# Patient Record
Sex: MALE | Race: WHITE | NOT HISPANIC OR LATINO | Employment: OTHER | URBAN - METROPOLITAN AREA
[De-identification: names, ages, dates, MRNs, and addresses within clinical notes are randomized per-mention and may not be internally consistent; named-entity substitution may affect disease eponyms.]

---

## 2022-09-23 ENCOUNTER — TRANSFERRED RECORDS (OUTPATIENT)
Dept: HEALTH INFORMATION MANAGEMENT | Facility: CLINIC | Age: 87
End: 2022-09-23

## 2022-11-04 ENCOUNTER — TRANSCRIBE ORDERS (OUTPATIENT)
Dept: OTHER | Age: 87
End: 2022-11-04

## 2022-11-04 DIAGNOSIS — C34.90 SMALL CELL LUNG CANCER (H): Primary | ICD-10-CM

## 2022-11-09 ENCOUNTER — TRANSFERRED RECORDS (OUTPATIENT)
Dept: HEALTH INFORMATION MANAGEMENT | Facility: CLINIC | Age: 87
End: 2022-11-09

## 2022-11-10 NOTE — PROGRESS NOTES
MEDICAL RECORDS REQUEST   Radiation Oncology  [address] [place] [address]  PHONE: [phone]  Fax: [phone]        FUTURE VISIT INFORMATION                                                   Penelope GAMBINO Amelia, : 1935 scheduled for future visit at [facility]    APPOINTMENT INFORMATION:    Date: 2022    Provider: Dr. Cota   Reason for Visit/Diagnosis: Small cell lung cancer    REFERRAL INFORMATION:    Referring provider:  Mell Banuelos MD- [facility] Recs     RECORDS REQUESTED FOR VISIT                                                     Action    Action Taken 11/10/2022 8:19AM HORACE   I pulled outside recs into CE/EPIC    I called [facility] dept [phone] - they will push ALL scans from  to [place] PACS    I called Mr. Cisneros - unavailable. I tried calling again- no luck.     11/10/2022 11:15AM HORACE  I called [facility] to see if they have an WING on file for pt Penelope Ph: [phone]  - I spoke [facility] in medical records- she will fax records over to [phone] Attn: Cristy     I will email the records to LASHAUN Mao once they have been received   [email or web address]    I resolved the images from [facility] in PACS    I called [facility] again - there are no rad onc notes.     11/10/2022 12:26pm HORACE   I faxed records from [facility] to HIM and emailed them to Daylin    11/10/2022 1:15pm HORACE   I called [facility] to see if they have a PET Scan Ph: [phone] #4- they don't have the pt in their system.     I called Mr. Cisneros again - no luck.     I called [facility] PH: [phone]- they don't have the pt in their system either.     2022 10:07AM HORACE   I called [facility] Phone: [phone] - need PET scan report and image to be pushed   They are able to push the PET Scan to [facility] PACS! They will fax the report over.     2022 10:34AM KEB   I resolved the PET scan in PACS  And I faxed the report to HIM. I  emailed the PET report to Daylin.      LUNG/CHEST WALL    CHEST CT AND REPORTS Complete- [facility] images are in PACS   PRE & POST-OP SCANS AND REPORT yes- [facility]    PET/CT AND REPORT Complete- Images are in PACS   ANY RECENT LABS yes   [facility] Records Complete - in [facility]   SURGICAL REPORT yes   PATHOLOGY REPORT yes   *Send all radiation Prior radiation records for both [facility] and [facility] Radiation Oncology patients to [facility] with  ATTN: OMARI/Cory Dosi/Physics

## 2022-11-11 ENCOUNTER — APPOINTMENT (OUTPATIENT)
Dept: RADIATION ONCOLOGY | Facility: CLINIC | Age: 87
End: 2022-11-11
Payer: MEDICARE

## 2022-11-11 ENCOUNTER — PRE VISIT (OUTPATIENT)
Dept: RADIATION ONCOLOGY | Facility: CLINIC | Age: 87
End: 2022-11-11

## 2022-11-11 ENCOUNTER — OFFICE VISIT (OUTPATIENT)
Dept: RADIATION ONCOLOGY | Facility: CLINIC | Age: 87
End: 2022-11-11
Attending: INTERNAL MEDICINE
Payer: MEDICARE

## 2022-11-11 VITALS
RESPIRATION RATE: 18 BRPM | OXYGEN SATURATION: 96 % | HEART RATE: 67 BPM | TEMPERATURE: 97.8 F | SYSTOLIC BLOOD PRESSURE: 150 MMHG | WEIGHT: 155.4 LBS | DIASTOLIC BLOOD PRESSURE: 69 MMHG

## 2022-11-11 DIAGNOSIS — C34.91 SMALL CELL CARCINOMA OF RIGHT LUNG (H): Primary | ICD-10-CM

## 2022-11-11 PROCEDURE — 77263 THER RADIOLOGY TX PLNG CPLX: CPT | Performed by: SURGERY

## 2022-11-11 PROCEDURE — 99205 OFFICE O/P NEW HI 60 MIN: CPT | Mod: 25 | Performed by: SURGERY

## 2022-11-11 PROCEDURE — 77334 RADIATION TREATMENT AID(S): CPT | Performed by: SURGERY

## 2022-11-11 RX ORDER — AMLODIPINE BESYLATE 5 MG/1
5 TABLET ORAL DAILY
COMMUNITY

## 2022-11-11 ASSESSMENT — PAIN SCALES - GENERAL: PAINLEVEL: NO PAIN (0)

## 2022-11-11 NOTE — LETTER
2022         RE: Penelope Cisneros  [address] [facility] [address]        Dear Colleague,    Thank you for referring your patient, Penelope Cisneros, to the [facility] [place]. Please see a copy of my visit note below.       Department of Radiation Oncology  [facility]: Cancer Center  [facility]  [address] [address] [place] [address]  [phone]       Consultation Note    Name: Penelope Cisneros MRN: [id number]   : 1935   Date of Service: 2022  Referring:  ([facility])     Reason for consultation: Limited stage small cell lung cancer    History of Present Illness     Mr. Cisneros is a 87M with LS-SCLC, [id number] of the right upper lobe. PET/CT positive in RUL and 10R.  MRI with indeterminate lesions in right caudate and right precentral gyrus wihtout any associated flair changes, most likely felt to be ischemic related.      Briefly, patient's oncologic history is as follows:    Patient initially presented in 2022 with abdominal pain and was treated for diverticulitis.  During his emergency room visit a CT scan was performed which incidentally demonstrated a 1.5 cm right upper lobe pulmonary nodule as well as associated right hilar adenopathy measuring up to 3.5 cm in size.    He subsequently underwent a PET CT scan on 2022 which demonstrated a 3.7 cm right hilar lymph node with an SUV uptake of 10.2 as well as a right upper lobe nodule measuring 1.3 cm in size with an SUV uptake of 13.7.  There is otherwise no evidence of extrathoracic disease.    He was eventually referred to Dr. Segovia, pulmonologist, for further evaluation.  He underwent bronchoscopy and EBUS on 2022 demonstrating a 3 cm lymph node at 10 R.  FNA returned As positive for a poorly differentiated carcinoma, that was felt most to be consistent with small cell carcinoma.  The bronchial washings were  negative for malignancy.    He was eventually referred to Dr. Banuelos of [place] [facility] with plan for a CT-guided biopsy of the right upper lobe.    He underwent CT-guided biopsy of the right upper lobe nodule with on 10/20/2022 with pathology returning as positive for small cell carcinoma.    Staging evaluation with an MRI of the brain was performed on 10/22/2022 demonstrating a 6 mm enhancing lesion in the right caudate as well as a 2 mm enhancing lesion in the right precentral gyrus, without any associated edema or effusion weighted restriction.  There were's findings most likely of chronic microvascular ischemic changes, although these are indeterminate lesions was felt that these were more likely related to subacute/late infarcts, but metastatic disease cannot be ruled out.    He has initiated cycle 1 of chemotherapy on 11/9/2022 with plan for carboplatin and etoposide.  He is due for his cycle 2 on 11/30/2022.    He was referred for discussion of concurrent chemoradiation for his newly diagnosed limited stage small cell lung cancer.  He is accompanied by both his wife and his son.  Overall, he denies any chest pain, hemoptysis, dyspnea, or oxygen requirement.  He is a active smoker smoking 1 pack a day for 65 years but is overall trying to quit and is down to 1 pack/week.  He does drink occasional alcohol.  He is an extremely active person and has an overall good performance status.  He and his family reside in [place], but they do have additional children in the [facility] area.  He denies any focal neurologic changes, or headaches, or vision changes.    Past Medical History:   Past Medical History:   Diagnosis Date     Gout      Personal history of chemotherapy     Carboplatin/Etoposide (cycle 1: 11/9/2022 - 11/11/2022)     Small cell lung cancer (H) 10/20/2022       Past Surgical History:   Past Surgical History:   Procedure Laterality Date     APPENDECTOMY       ARTHROSCOPY SHOULDER Left        REPAIR CLEFT PALATE  1937     ROTATOR CUFF REPAIR RT/LT Right      TONSILLECTOMY         Chemotherapy History:  Chemotherapy initiated on 2022 with carboplatin and etoposide (cycle 1, Dr. Banuelos of [place] [facility])    Radiation History:  No prior radiation    Pregnant: No  Implanted Cardiac Devices: No    Medications:  Current Outpatient Medications   Medication     amLODIPine (NORVASC) 5 MG tablet     Multiple Vitamin (MULTIVITAMIN PO)     Multiple Vitamins-Minerals (OCUVITE PRESERVISION PO)     No current facility-administered medications for this visit.         Allergies:   No Known Allergies    Social History:  Tobacco: 1 pack a day smoker for 65 years, now attempting to quit and down to 1 pack/week  Alcohol: Occasional alcohol  Lives in [place], fairly active able to carry out his ADLs independently, has family in the [facility] area    Family History:  Family History   Problem Relation Age of Onset     Cancer Father         Unknown type per patient     Prostate Cancer Brother        Review of Systems   A 10-point review of systems was performed. Pertinent findings are noted in the HPI.    Physical Exam   ECOG Status: 2    Vitals:  BP (!) 150/69 (BP Location: Left arm, Patient Position: Chair, Cuff Size: Adult Regular)   Pulse 67   Temp 97.8  F (36.6  C) (Oral)   Resp 18   Wt 70.5 kg (155 lb 6.4 oz)   SpO2 96%     Gen: Alert, in NAD  Head: NC/AT  Eyes: PERRL, EOMI, sclera anicteric  Ears: No external auricular lesions  Nose/sinus: No rhinorrhea or epistaxis  Oral cavity/oropharynx: MMM, no visible oral cavity lesions, FOM and BOT are soft to palpation  Neck: Full ROM, supple, no palpable adenopathy  Pulm: No wheezing, stridor or respiratory distress  CV: Extremities are warm and well-perfused, no cyanosis, no pedal edema  Abdominal: Normal bowel sounds, soft, nontender, no masses  Musculoskeletal: Normal bulk and tone  Skin: Normal color and turgor  Neuro: A/Ox3, CN  II-XII intact, normal gait    Imaging/Path/Labs   Imaging: Per HPI reviewed and in agreement    Path: Per HPI reviewed and in agreement    Assessment      Mr. Cisneros is a 87M with LS-SCLC, [id number] of the right upper lobe. PET/CT positive in RUL and 10R.  MRI with indeterminate lesions in right caudate and right precentral gyrus wihtout any associated flair changes, most likely felt to be ischemic related.       We discussed the NCCN guidelines for management of limited stage small cell lung cancer (LS-SCLC). In patients with T1-T2N0 (I-IIA) small cell lung cancer with negative mediastinal staging, lobectomy is preferred (category 1), and in medically inoperable patients SBRT versus concurrent chemotherapy with radiotherapy is indicated.     In patients with limited stage lung cancer T3-T4N0M0, W2-C4W5-U1L3 (IB-IIIC), recommendation is for combined systemic therapy with concurrent radiotherapy.  The benefits of thoracic radiation were reviewed and explained that the addition of thoracic radiation improves local control as well as provides an absolute survival benefit of approximately 5% in limited stage SCLC based on the results of two meta-analyses (Alexandro, ALDENM 1992; MALATHI Schulte 1992).     We also discussed that standard of care RT regimens usually include twice daily fractionation (RTOG 8815, Ran et al, NEJM 1999) but that additional options include daily fractionation (Sarai OZUNA, Lancet Oncol 2017).       Further, we briefly discussed the the role of  prophylactic cranial irradiation (PCI).In patients with LS-SCLC with complete or partial response, patients may receive prophylactic cranial irradiation (PCI). Historical analyses have demonstrated an overall survival benefit (Angi et al, [place]-analysis, NEJM 1999).  However, in the modern era with surveillance brain MRIs, the risks of benefits of PCI versus close surveillance is more controversial, as the survival benefit is less clear in patients  receiving routine surveillance imaging (Hiwot et al, Lancet Oncol 2017). While this data has been published in patients with extensive-stage lung cancer, the data may be extrapolated to the LS-SCLC. We stated that we can discuss PCI in more detail after completion treatment with concurrent chemoRT.     Plan     1. After discussion patient wishes to proceed with concurrent chemoRT for LS-SCLC.     2. Patient has opted to pursue daily  fractionation (66G/33fx).     3. CT simulation was performed today 11/11/22.    4. Plan with plan for cycle 2 start, 11/30/22. Patient has received receive cycle 1 chemotherapy on 11/9/22 (Dr. Banuelos, [facility]).    5. Will finalize PCI discussion after completion of chemoRT.    6. Smoking cessation discussed.     All benefits and risks discussed, and patient is in agreement with the oncologic plan discussed above.     Thank you for allowing me to participate in your patient's care.  If you should require any additional information, please do not hesitate in contacting me.     Imtiaz Cota MD  Department of Radiation Oncology  [facility]       Again, thank you for allowing me to participate in the care of your patient.        Sincerely,        Imtiaz Cota MD

## 2022-11-11 NOTE — NURSING NOTE
INITIAL PATIENT ASSESSMENT      Diagnosis: small cell lung cancer    Prior radiation therapy: None    Prior chemotherapy:   Protocol: Carboplatin/Etoposide  Facility: [facility] - Dr. Banuelos  Dates: Cycle 1: 11/9/2022 - 11/11/2022, Cycle 2 scheduled: 11/30/2022 - 12/2/2022      Prior hormonal therapy:No    Pain Eval:  Denies    Psychosocial  Living arrangements: lives at home in [place], presents to clinic with wife and son, patient reports he is receiving treatment in [place] as three of his adult children live in [place]  Fall Risk: independent  MIPS Falls Risk Screening Completed: Yes Result: Negative   referral needs: Not needed    Advanced Directive: Yes - Location: family has copy  Implantable Cardiac Device: No  Authorization To Share Protected Health Information: YES - Date: 11/11/2022      Reproductive note: not recorded for male patient.     Review of Systems     Constitutional: Negative.    HENT: Negative.    Eyes: Negative.    Respiratory: Negative.    Cardiovascular: Negative.    Gastrointestinal: Negative.    Genitourinary: Negative.    Musculoskeletal: Negative.    Skin: Negative.    Neurological: Negative.    Endo/Heme/Allergies: Negative.    Psychiatric/Behavioral: Negative.      Nurse face-to-face time: Level 5:  over 15 min face to face time.    Daylin Nevarez RN BSN OCN CBCN

## 2022-11-11 NOTE — PATIENT INSTRUCTIONS
What to expect at your Simulation visit:    You will meet with a Radiation Therapist and other team members who will be doing a planning session called a  simulation  with you. This process will determine your daily treatment.    ~ You will lie on a flat table and have a treatment planning CT scan.  It is important during the scan to hold very still and breathe normally.    ~ Your therapist may construct a body mold to help you hold still for your treatments.    ~ If you are having treatment to the head or neck area you will be fitted with a plastic mesh mask that fits very snugly over your face and neck.     ~ Your therapist will be taking some digital photos that will go in your treatment chart.      ~Your therapist will make marks on your skin and take measurements. Your therapist may ask you about making small tattoos (a permanent small dot) over these marks.  These marks are used to position you daily for your radiation therapy treatments. Please do not wash off any marks until all of your radiation therapy treatments are complete unless you are instructed to do so by your therapist.    ~ Once the simulation is completed it can take from 3 to 10 business days before you start radiation therapy treatments.    ~ You may meet with a nurse who will go over management of treatment side effects and self care during your treatments. The nurse will help to plan care with other departments and physicians if needed.       Please contact [facility] RN with questions or concerns following today's appointment: [phone].    Thank you!

## 2022-11-11 NOTE — PROGRESS NOTES
Department of Radiation Oncology  [facility]: Cancer Center  [facility]  [address] [address] [place] [address]  [phone]       Consultation Note    Name: Penelope Cisneros MRN: [id number]   : 1935   Date of Service: 2022  Referring:  ([facility])     Reason for consultation: Limited stage small cell lung cancer    History of Present Illness     Mr. Cisneros is a 87M with LS-SCLC, [id number] of the right upper lobe. PET/CT positive in RUL and 10R.  MRI with indeterminate lesions in right caudate and right precentral gyrus wihtout any associated flair changes, most likely felt to be ischemic related.      Briefly, patient's oncologic history is as follows:    Patient initially presented in 2022 with abdominal pain and was treated for diverticulitis.  During his emergency room visit a CT scan was performed which incidentally demonstrated a 1.5 cm right upper lobe pulmonary nodule as well as associated right hilar adenopathy measuring up to 3.5 cm in size.    He subsequently underwent a PET CT scan on 2022 which demonstrated a 3.7 cm right hilar lymph node with an SUV uptake of 10.2 as well as a right upper lobe nodule measuring 1.3 cm in size with an SUV uptake of 13.7.  There is otherwise no evidence of extrathoracic disease.    He was eventually referred to Dr. Segovia, pulmonologist, for further evaluation.  He underwent bronchoscopy and EBUS on 2022 demonstrating a 3 cm lymph node at 10 R.  FNA returned As positive for a poorly differentiated carcinoma, that was felt most to be consistent with small cell carcinoma.  The bronchial washings were negative for malignancy.    He was eventually referred to Dr. Banuelos of [place] [facility] with plan for a CT-guided biopsy of the right upper lobe.    He underwent CT-guided biopsy of the right upper lobe nodule with on 10/20/2022 with pathology returning as positive  for small cell carcinoma.    Staging evaluation with an MRI of the brain was performed on 10/22/2022 demonstrating a 6 mm enhancing lesion in the right caudate as well as a 2 mm enhancing lesion in the right precentral gyrus, without any associated edema or effusion weighted restriction.  There were's findings most likely of chronic microvascular ischemic changes, although these are indeterminate lesions was felt that these were more likely related to subacute/late infarcts, but metastatic disease cannot be ruled out.    He has initiated cycle 1 of chemotherapy on 2022 with plan for carboplatin and etoposide.  He is due for his cycle 2 on 2022.    He was referred for discussion of concurrent chemoradiation for his newly diagnosed limited stage small cell lung cancer.  He is accompanied by both his wife and his son.  Overall, he denies any chest pain, hemoptysis, dyspnea, or oxygen requirement.  He is a active smoker smoking 1 pack a day for 65 years but is overall trying to quit and is down to 1 pack/week.  He does drink occasional alcohol.  He is an extremely active person and has an overall good performance status.  He and his family reside in [place], but they do have additional children in the [facility] area.  He denies any focal neurologic changes, or headaches, or vision changes.    Past Medical History:   Past Medical History:   Diagnosis Date     Gout      Personal history of chemotherapy     Carboplatin/Etoposide (cycle 1: 2022 - 2022)     Small cell lung cancer (H) 10/20/2022       Past Surgical History:   Past Surgical History:   Procedure Laterality Date     APPENDECTOMY       ARTHROSCOPY SHOULDER Left       REPAIR CLEFT PALATE  1937     ROTATOR CUFF REPAIR RT/LT Right      TONSILLECTOMY         Chemotherapy History:  Chemotherapy initiated on 2022 with carboplatin and etoposide (cycle 1, Dr. Banuelos of [place] [facility])    Radiation History:  No prior  radiation    Pregnant: No  Implanted Cardiac Devices: No    Medications:  Current Outpatient Medications   Medication     amLODIPine (NORVASC) 5 MG tablet     Multiple Vitamin (MULTIVITAMIN PO)     Multiple Vitamins-Minerals (OCUVITE PRESERVISION PO)     No current facility-administered medications for this visit.         Allergies:   No Known Allergies    Social History:  Tobacco: 1 pack a day smoker for 65 years, now attempting to quit and down to 1 pack/week  Alcohol: Occasional alcohol  Lives in [place], fairly active able to carry out his ADLs independently, has family in the [facility] area    Family History:  Family History   Problem Relation Age of Onset     Cancer Father         Unknown type per patient     Prostate Cancer Brother        Review of Systems   A 10-point review of systems was performed. Pertinent findings are noted in the HPI.    Physical Exam   ECOG Status: 2    Vitals:  BP (!) 150/69 (BP Location: Left arm, Patient Position: Chair, Cuff Size: Adult Regular)   Pulse 67   Temp 97.8  F (36.6  C) (Oral)   Resp 18   Wt 70.5 kg (155 lb 6.4 oz)   SpO2 96%     Gen: Alert, in NAD  Head: NC/AT  Eyes: PERRL, EOMI, sclera anicteric  Ears: No external auricular lesions  Nose/sinus: No rhinorrhea or epistaxis  Oral cavity/oropharynx: MMM, no visible oral cavity lesions, FOM and BOT are soft to palpation  Neck: Full ROM, supple, no palpable adenopathy  Pulm: No wheezing, stridor or respiratory distress  CV: Extremities are warm and well-perfused, no cyanosis, no pedal edema  Abdominal: Normal bowel sounds, soft, nontender, no masses  Musculoskeletal: Normal bulk and tone  Skin: Normal color and turgor  Neuro: A/Ox3, CN II-XII intact, normal gait    Imaging/Path/Labs   Imaging: Per HPI reviewed and in agreement    Path: Per HPI reviewed and in agreement    Assessment      Mr. Cisneros is a 87M with LS-SCLC, [id number] of the right upper lobe. PET/CT positive in RUL and 10R.  MRI with  indeterminate lesions in right caudate and right precentral gyrus wihtout any associated flair changes, most likely felt to be ischemic related.       We discussed the NCCN guidelines for management of limited stage small cell lung cancer (LS-SCLC). In patients with T1-T2N0 (I-IIA) small cell lung cancer with negative mediastinal staging, lobectomy is preferred (category 1), and in medically inoperable patients SBRT versus concurrent chemotherapy with radiotherapy is indicated.     In patients with limited stage lung cancer T3-T4N0M0, O4-S7G5-L6Z8 (IB-IIIC), recommendation is for combined systemic therapy with concurrent radiotherapy.  The benefits of thoracic radiation were reviewed and explained that the addition of thoracic radiation improves local control as well as provides an absolute survival benefit of approximately 5% in limited stage SCLC based on the results of two meta-analyses (Alexandro, NEALDENM 1992; MALATHI Schulte 1992).     We also discussed that standard of care RT regimens usually include twice daily fractionation (RTOG 8815, Ran et al, NEJM 1999) but that additional options include daily fractionation (Sarai OZUNA, Lancet Oncol 2017).       Further, we briefly discussed the the role of  prophylactic cranial irradiation (PCI).In patients with LS-SCLC with complete or partial response, patients may receive prophylactic cranial irradiation (PCI). Historical analyses have demonstrated an overall survival benefit (Angi et al, [place]-analysis, NEJM 1999).  However, in the modern era with surveillance brain MRIs, the risks of benefits of PCI versus close surveillance is more controversial, as the survival benefit is less clear in patients receiving routine surveillance imaging (Hiwot et al, Lancet Oncol 2017). While this data has been published in patients with extensive-stage lung cancer, the data may be extrapolated to the LS-SCLC. We stated that we can discuss PCI in more detail after  completion treatment with concurrent chemoRT.     Plan     1. After discussion patient wishes to proceed with concurrent chemoRT for LS-SCLC.     2. Patient has opted to pursue daily  fractionation (66G/33fx).     3. CT simulation was performed today 11/11/22.    4. Plan with plan for cycle 2 start, 11/30/22. Patient has received receive cycle 1 chemotherapy on 11/9/22 (Dr. Banuelos, [facility]).    5. Will finalize PCI discussion after completion of chemoRT.    6. Smoking cessation discussed.     All benefits and risks discussed, and patient is in agreement with the oncologic plan discussed above.     Thank you for allowing me to participate in your patient's care.  If you should require any additional information, please do not hesitate in contacting me.     Imtiaz Cota MD  Department of Radiation Oncology  [facility]

## 2022-11-14 ENCOUNTER — TELEPHONE (OUTPATIENT)
Dept: RADIATION ONCOLOGY | Facility: CLINIC | Age: 87
End: 2022-11-14

## 2022-11-14 NOTE — TELEPHONE ENCOUNTER
Good morning Dr. Gonzalez,    We are reaching out to see if you'd be able to complete the office notes for this patient dated 11/11/2022 for prior authorization purposes.    We'd like to submit the authorization request today if possible.     Thank you,    Angie Douglas  Senior Intake Financial Counselor  [facility]  [address] [facility] [place] [address]  Ph: [phone]  Fax: [phone]

## 2022-11-16 ENCOUNTER — APPOINTMENT (OUTPATIENT)
Dept: RADIATION ONCOLOGY | Facility: CLINIC | Age: 87
End: 2022-11-16
Payer: MEDICARE

## 2022-11-16 PROCEDURE — 77470 SPECIAL RADIATION TREATMENT: CPT | Performed by: SURGERY

## 2022-11-29 ENCOUNTER — APPOINTMENT (OUTPATIENT)
Dept: RADIATION ONCOLOGY | Facility: CLINIC | Age: 87
End: 2022-11-29
Payer: MEDICARE

## 2022-11-29 PROCEDURE — 77301 RADIOTHERAPY DOSE PLAN IMRT: CPT | Performed by: SURGERY

## 2022-11-29 PROCEDURE — 77338 DESIGN MLC DEVICE FOR IMRT: CPT | Performed by: SURGERY

## 2022-11-29 PROCEDURE — 77300 RADIATION THERAPY DOSE PLAN: CPT | Performed by: SURGERY

## 2022-11-29 PROCEDURE — 77293 RESPIRATOR MOTION MGMT SIMUL: CPT | Performed by: SURGERY

## 2022-11-30 ENCOUNTER — PATIENT OUTREACH (OUTPATIENT)
Dept: RADIATION ONCOLOGY | Facility: CLINIC | Age: 87
End: 2022-11-30

## 2022-11-30 ENCOUNTER — TRANSFERRED RECORDS (OUTPATIENT)
Dept: HEALTH INFORMATION MANAGEMENT | Facility: CLINIC | Age: 87
End: 2022-11-30

## 2022-11-30 NOTE — TELEPHONE ENCOUNTER
Received telephone call from patient's son Cristopher reporting that patient was to start cycle 2 of his chemotherapy today with [facility], however, due to his lab results, his cycle is delayed for one week and will receive chemotherapy on Wednesday, 12/7/2022, Thursday, 12/8/2022 and Friday, 12/9/2022.  This RN reviewed with Dr. Cota and plan is to continue with radiation treatment as scheduled with start date of Thursday, 12/1/2022.  Reviewed information with patient and patient's son and verbalized understanding and confirmed radiation start date for 12/1/2022.    Daylin Nevarez, RN BSN OCN CBCN

## 2022-12-01 ENCOUNTER — APPOINTMENT (OUTPATIENT)
Dept: RADIATION ONCOLOGY | Facility: CLINIC | Age: 87
End: 2022-12-01
Payer: MEDICARE

## 2022-12-01 PROCEDURE — 77386 PR IMRT TREATMENT DELIVERY, COMPLEX: CPT | Performed by: SURGERY

## 2022-12-01 PROCEDURE — 77014 PR CT GUIDE FOR PLACEMENT RADIATION THERAPY FIELDS: CPT | Performed by: SURGERY

## 2022-12-02 ENCOUNTER — APPOINTMENT (OUTPATIENT)
Dept: RADIATION ONCOLOGY | Facility: CLINIC | Age: 87
End: 2022-12-02
Payer: MEDICARE

## 2022-12-02 PROCEDURE — 77014 PR CT GUIDE FOR PLACEMENT RADIATION THERAPY FIELDS: CPT | Performed by: SURGERY

## 2022-12-02 PROCEDURE — 77386 PR IMRT TREATMENT DELIVERY, COMPLEX: CPT | Performed by: SURGERY

## 2022-12-05 ENCOUNTER — APPOINTMENT (OUTPATIENT)
Dept: RADIATION ONCOLOGY | Facility: CLINIC | Age: 87
End: 2022-12-05
Payer: MEDICARE

## 2022-12-05 PROCEDURE — 77014 PR CT GUIDE FOR PLACEMENT RADIATION THERAPY FIELDS: CPT | Performed by: RADIOLOGY

## 2022-12-05 PROCEDURE — 77386 PR IMRT TREATMENT DELIVERY, COMPLEX: CPT | Performed by: RADIOLOGY

## 2022-12-06 ENCOUNTER — APPOINTMENT (OUTPATIENT)
Dept: RADIATION ONCOLOGY | Facility: CLINIC | Age: 87
End: 2022-12-06
Payer: MEDICARE

## 2022-12-06 ENCOUNTER — OFFICE VISIT (OUTPATIENT)
Dept: RADIATION ONCOLOGY | Facility: CLINIC | Age: 87
End: 2022-12-06
Payer: MEDICARE

## 2022-12-06 VITALS
DIASTOLIC BLOOD PRESSURE: 81 MMHG | TEMPERATURE: 97.5 F | RESPIRATION RATE: 16 BRPM | OXYGEN SATURATION: 100 % | HEART RATE: 80 BPM | WEIGHT: 147.8 LBS | SYSTOLIC BLOOD PRESSURE: 156 MMHG

## 2022-12-06 DIAGNOSIS — C34.91 SMALL CELL CARCINOMA OF RIGHT LUNG (H): Primary | ICD-10-CM

## 2022-12-06 PROCEDURE — 99207 PR DROP WITH A PROCEDURE: CPT | Performed by: SURGERY

## 2022-12-06 PROCEDURE — 77386 PR IMRT TREATMENT DELIVERY, COMPLEX: CPT | Performed by: SURGERY

## 2022-12-06 PROCEDURE — 77014 PR CT GUIDE FOR PLACEMENT RADIATION THERAPY FIELDS: CPT | Performed by: SURGERY

## 2022-12-06 ASSESSMENT — PAIN SCALES - GENERAL: PAINLEVEL: NO PAIN (0)

## 2022-12-06 NOTE — LETTER
2022         RE: Penelope Cisneros  [address] [address]        Dear Colleague,    Thank you for referring your patient, Penelope Cisneros, to the [facility] RADIATION ONCOLOGY [place]. Please see a copy of my visit note below.    [facility] PHYSICIANS  SPECIALIZING IN BREAKTHROUGHS  Radiation Oncology    On Treatment Visit Note      Penelope Cisneros      Date: Dec 6, 2022   MRN: [id number]   : 1935  Diagnosis: limited stage small cell lung cancer        ID: Mr. Cisneros is a 87M with LS-SCLC, [id number] of the right upper lobe. PET/CT positive in R[facility] and 10R.  MRI with indeterminate lesions in right caudate and right precentral gyrus wihtout any associated flair changes, most likely felt to be ischemic related. Plan for 66Gy/33fx with concurrent chemotherapy (cycle 2 start, Dr. Calabrese, [facility]).     Reason for Visit:  On Radiation Treatment Visit       Treatment Summary to Date  Treatment Site: R[facility], [facility] Current Dose: 800/6600 cGy Fractions:       Chemotherapy  Chemo concurrent with radx?: Yes  Oncologist: Dr. Banuelos  Drug Name/Frequency 1: Carbo/Etoposide    Subjective:   Second cycle chemo held last week due to neutropenia. Will receive second cycle tomorrow. No complaints, tolerating RT well overall.     Nursing ROS:   Nutrition Alteration  Diet Type: Patient's Preference  Skin  Skin Reaction: 0 - No changes     ENT and Mouth Exam  Esophagitis: 0 - None  Cardiovascular  Respiratory effort: 1 - Normal - without distress        Psychosocial  Mood - Anxiety: 0 - Normal  Mood - Depression: 0 - Normal  Psychosocial Note: energy level at baseline  Pain Assessment  0-10 Pain Scale: 0      Objective:   BP (!) 156/81 (BP Location: Left arm, Patient Position: Chair, Cuff Size: Adult Regular)   Pulse 80   Temp 97.5  F (36.4  C) (Oral)   Resp 16   Wt 67 kg (147 lb 12.8 oz)   SpO2 100%   Gen: Appears well, in no acute distress  Skin: No erythema  CV/Resp:  rrr, breathing comfortably on room air  Neuro: CN 2-12 grossly intact, UE/LE full strength     Labs:  CBC RESULTS: No results for input(s): WBC, RBC, HGB, HCT, MCV, MCH, MCHC, RDW, PLT in the last 03708 hours.  ELECTROLYTES:  No results for input(s): NA, POTASSIUM, CHLORIDE, ISA, CO2, BUN, CR, GLC in the last 97877 hours.    Assessment:    Tolerating radiation therapy well.  All questions and concerns addressed.      Plan:   1. Continue current therapy.        Mosaiq chart and setup information reviewed  Ports checked and MVCT/IGRT images checked    Medication Review  Med list reviewed with patient?: Yes  Med list printed and given: Offered and declined    Educational Topic Discussed  Additional Instructions: cycle 2 of chemotherapy scheduled 12/7/2022 - 12/9/2022  Education Instructions: radiation therapy side effects: fatigue, skin changes and skin cares, cough/shortness of breath, pain/difficulty with swallowing    Imtiaz Cota MD  Radiation Oncology   [facility]  Clinic: [phone]            Again, thank you for allowing me to participate in the care of your patient.        Sincerely,        Imtiaz Cota MD

## 2022-12-06 NOTE — PROGRESS NOTES
[facility] PHYSICIANS  SPECIALIZING IN BREAKTHROUGHS  Radiation Oncology    On Treatment Visit Note      Penelope Cisneros      Date: Dec 6, 2022   MRN: [id number]   : 1935  Diagnosis: limited stage small cell lung cancer        ID: Mr. Cisneros is a 87M with LS-SCLC, [id number] of the right upper lobe. PET/CT positive in RUL and 10R.  MRI with indeterminate lesions in right caudate and right precentral gyrus wihtout any associated flair changes, most likely felt to be ischemic related. Plan for 66Gy/33fx with concurrent chemotherapy (cycle 2 start, Dr. Calabrese, [facility]).     Reason for Visit:  On Radiation Treatment Visit       Treatment Summary to Date  Treatment Site: R[facility], [facility] Current Dose: 800/6600 cGy Fractions:       Chemotherapy  Chemo concurrent with radx?: Yes  Oncologist: Dr. Banuelos  Drug Name/Frequency 1: Carbo/Etoposide    Subjective:   Second cycle chemo held last week due to neutropenia. Will receive second cycle tomorrow. No complaints, tolerating RT well overall.     Nursing ROS:   Nutrition Alteration  Diet Type: Patient's Preference  Skin  Skin Reaction: 0 - No changes     ENT and Mouth Exam  Esophagitis: 0 - None  Cardiovascular  Respiratory effort: 1 - Normal - without distress        Psychosocial  Mood - Anxiety: 0 - Normal  Mood - Depression: 0 - Normal  Psychosocial Note: energy level at baseline  Pain Assessment  0-10 Pain Scale: 0      Objective:   BP (!) 156/81 (BP Location: Left arm, Patient Position: Chair, Cuff Size: Adult Regular)   Pulse 80   Temp 97.5  F (36.4  C) (Oral)   Resp 16   Wt 67 kg (147 lb 12.8 oz)   SpO2 100%   Gen: Appears well, in no acute distress  Skin: No erythema  CV/Resp: rrr, breathing comfortably on room air  Neuro: CN 2-12 grossly intact, UE/LE full strength     Labs:  CBC RESULTS: No results for input(s): WBC, RBC, HGB, HCT, MCV, MCH, MCHC, RDW, PLT in the last 81458 hours.  ELECTROLYTES:  No results for input(s): NA,  POTASSIUM, CHLORIDE, ISA, CO2, BUN, CR, GLC in the last 13789 hours.    Assessment:    Tolerating radiation therapy well.  All questions and concerns addressed.      Plan:   1. Continue current therapy.        Mosaiq chart and setup information reviewed  Ports checked and MVCT/IGRT images checked    Medication Review  Med list reviewed with patient?: Yes  Med list printed and given: Offered and declined    Educational Topic Discussed  Additional Instructions: cycle 2 of chemotherapy scheduled 12/7/2022 - 12/9/2022  Education Instructions: radiation therapy side effects: fatigue, skin changes and skin cares, cough/shortness of breath, pain/difficulty with swallowing    Imtiaz Cota MD  Radiation Oncology   [facility]  Clinic: [phone]

## 2022-12-06 NOTE — PATIENT INSTRUCTIONS
Please contact [facility] RN with questions or concerns following today's appointment: [phone].    Thank you!

## 2022-12-07 ENCOUNTER — APPOINTMENT (OUTPATIENT)
Dept: RADIATION ONCOLOGY | Facility: CLINIC | Age: 87
End: 2022-12-07
Payer: MEDICARE

## 2022-12-07 PROCEDURE — 77386 PR IMRT TREATMENT DELIVERY, COMPLEX: CPT | Performed by: RADIOLOGY

## 2022-12-07 PROCEDURE — 77014 PR CT GUIDE FOR PLACEMENT RADIATION THERAPY FIELDS: CPT | Performed by: RADIOLOGY

## 2022-12-07 PROCEDURE — 77336 RADIATION PHYSICS CONSULT: CPT | Performed by: SURGERY

## 2022-12-07 PROCEDURE — 77427 RADIATION TX MANAGEMENT X5: CPT | Performed by: SURGERY

## 2022-12-08 ENCOUNTER — APPOINTMENT (OUTPATIENT)
Dept: RADIATION ONCOLOGY | Facility: CLINIC | Age: 87
End: 2022-12-08
Payer: MEDICARE

## 2022-12-08 PROCEDURE — 77014 PR CT GUIDE FOR PLACEMENT RADIATION THERAPY FIELDS: CPT | Performed by: RADIOLOGY

## 2022-12-08 PROCEDURE — 77386 PR IMRT TREATMENT DELIVERY, COMPLEX: CPT | Performed by: RADIOLOGY

## 2022-12-09 ENCOUNTER — APPOINTMENT (OUTPATIENT)
Dept: RADIATION ONCOLOGY | Facility: CLINIC | Age: 87
End: 2022-12-09
Payer: MEDICARE

## 2022-12-09 PROCEDURE — 77014 PR CT GUIDE FOR PLACEMENT RADIATION THERAPY FIELDS: CPT | Performed by: RADIOLOGY

## 2022-12-09 PROCEDURE — 77386 PR IMRT TREATMENT DELIVERY, COMPLEX: CPT | Performed by: RADIOLOGY

## 2022-12-12 ENCOUNTER — APPOINTMENT (OUTPATIENT)
Dept: RADIATION ONCOLOGY | Facility: CLINIC | Age: 87
End: 2022-12-12
Payer: MEDICARE

## 2022-12-12 PROCEDURE — 77386 PR IMRT TREATMENT DELIVERY, COMPLEX: CPT | Performed by: RADIOLOGY

## 2022-12-12 PROCEDURE — 77014 PR CT GUIDE FOR PLACEMENT RADIATION THERAPY FIELDS: CPT | Performed by: RADIOLOGY

## 2022-12-13 ENCOUNTER — APPOINTMENT (OUTPATIENT)
Dept: RADIATION ONCOLOGY | Facility: CLINIC | Age: 87
End: 2022-12-13
Payer: MEDICARE

## 2022-12-13 PROCEDURE — 77386 PR IMRT TREATMENT DELIVERY, COMPLEX: CPT | Performed by: SURGERY

## 2022-12-13 PROCEDURE — 77014 PR CT GUIDE FOR PLACEMENT RADIATION THERAPY FIELDS: CPT | Performed by: SURGERY

## 2022-12-14 ENCOUNTER — OFFICE VISIT (OUTPATIENT)
Dept: RADIATION ONCOLOGY | Facility: CLINIC | Age: 87
End: 2022-12-14
Payer: MEDICARE

## 2022-12-14 ENCOUNTER — APPOINTMENT (OUTPATIENT)
Dept: RADIATION ONCOLOGY | Facility: CLINIC | Age: 87
End: 2022-12-14
Payer: MEDICARE

## 2022-12-14 VITALS
DIASTOLIC BLOOD PRESSURE: 68 MMHG | RESPIRATION RATE: 16 BRPM | HEART RATE: 87 BPM | SYSTOLIC BLOOD PRESSURE: 149 MMHG | WEIGHT: 148.1 LBS

## 2022-12-14 DIAGNOSIS — C34.91 SMALL CELL CARCINOMA OF RIGHT LUNG (H): Primary | ICD-10-CM

## 2022-12-14 PROCEDURE — 77014 PR CT GUIDE FOR PLACEMENT RADIATION THERAPY FIELDS: CPT | Performed by: SURGERY

## 2022-12-14 PROCEDURE — 77336 RADIATION PHYSICS CONSULT: CPT | Performed by: SURGERY

## 2022-12-14 PROCEDURE — 77386 PR IMRT TREATMENT DELIVERY, COMPLEX: CPT | Performed by: SURGERY

## 2022-12-14 PROCEDURE — 99207 PR DROP WITH A PROCEDURE: CPT | Performed by: SURGERY

## 2022-12-14 PROCEDURE — 77427 RADIATION TX MANAGEMENT X5: CPT | Performed by: SURGERY

## 2022-12-14 NOTE — LETTER
2022         RE: Penelope Cisneros  [address] [address]        Dear Colleague,    Thank you for referring your patient, Penelope Cisneros, to the [facility] RADIATION ONCOLOGY [place]. Please see a copy of my visit note below.    [facility] PHYSICIANS  SPECIALIZING IN BREAKTHROUGHS  Radiation Oncology    On Treatment Visit Note      Penelope Cisneros      Date: Dec 14, 2022   MRN: [id number]   : 1935  Diagnosis: limited stage small cell lung cancer        ID:  Mr. Cisneros is a 87M with LS-SCLC, [id number] of the right upper lobe. PET/CT positive in R[facility] and 10R.  MRI with indeterminate lesions in right caudate and right precentral gyrus wihtout any associated flair changes, most likely felt to be ischemic related. Plan for 66Gy/33fx with concurrent chemotherapy (cycle 2 start, Dr. Banuelos, [facility]).     Reason for Visit:  On Radiation Treatment Visit       Treatment Summary to Date  Treatment Site: R[facility], [facility] Current Dose: 2000/6600 cGy Fractions: 10/33      Chemotherapy  Chemo concurrent with radx?: Yes  Oncologist: Dr. Banuelos  Drug Name/Frequency 1: Carbo/Etoposide    Subjective:   No complaints, tolerating RT well overall. Weight stable, no dyspnea, no odynophagia.     Nursing ROS:   Nutrition Alteration  Diet Type: Patient's Preference  Skin  Skin Reaction: 0 - No changes     ENT and Mouth Exam  Esophagitis: 0 - None  Cardiovascular  Respiratory effort: 1 - Normal - without distress  Gastrointestinal  Nausea: 0 - None     Psychosocial  Mood - Anxiety: 0 - Normal  Mood - Depression: 0 - Normal  Psychosocial Note: energy level at baseline  Pain Assessment  0-10 Pain Scale: 0      Objective:   BP (!) 149/68   Pulse 87   Resp 16   Wt 67.2 kg (148 lb 1.6 oz)   Gen: Appears well, in no acute distress  Skin: No erythema  CV/Resp: rrr, breathing comfortably on room air  Neuro: CN 2-12 grossly intact, UE/LE full strength     Labs:  CBC RESULTS: No results for  input(s): WBC, RBC, HGB, HCT, MCV, MCH, MCHC, RDW, PLT in the last 52494 hours.  ELECTROLYTES:  No results for input(s): NA, POTASSIUM, CHLORIDE, ISA, CO2, BUN, CR, GLC in the last 84977 hours.    Assessment:    Tolerating radiation therapy well.  All questions and concerns addressed.      Plan:   1. Continue current therapy.        Mosaiq chart and setup information reviewed  Ports checked and MVCT/IGRT images checked    Medication Review  Med list reviewed with patient?: Yes    Educational Topic Discussed  Additional Instructions: cycle 2 of chemotherapy scheduled 12/7/2022 - 12/9/2022  Education Instructions: radiation therapy side effects: fatigue, skin changes and skin cares, cough/shortness of breath, pain/difficulty with swallowing    Imtiaz Cota MD  Radiation Oncology   [facility]  Clinic: [phone]            Again, thank you for allowing me to participate in the care of your patient.        Sincerely,        Imtiaz Cota MD

## 2022-12-14 NOTE — PROGRESS NOTES
[facility] PHYSICIANS  SPECIALIZING IN BREAKTHROUGHS  Radiation Oncology    On Treatment Visit Note      Penelope Cisneros      Date: Dec 14, 2022   MRN: [id number]   : 1935  Diagnosis: limited stage small cell lung cancer        ID:  Mr. Cisneros is a 87M with LS-SCLC, [id number] of the right upper lobe. PET/CT positive in R[facility] and 10R.  MRI with indeterminate lesions in right caudate and right precentral gyrus wihtout any associated flair changes, most likely felt to be ischemic related. Plan for 66Gy/33fx with concurrent chemotherapy (cycle 2 start, Dr. Banuelos, [facility]).     Reason for Visit:  On Radiation Treatment Visit       Treatment Summary to Date  Treatment Site: R[facility], [facility] Current Dose: 2000/6600 cGy Fractions: 10/33      Chemotherapy  Chemo concurrent with radx?: Yes  Oncologist: Dr. Banuelos  Drug Name/Frequency 1: Carbo/Etoposide    Subjective:   No complaints, tolerating RT well overall. Weight stable, no dyspnea, no odynophagia.     Nursing ROS:   Nutrition Alteration  Diet Type: Patient's Preference  Skin  Skin Reaction: 0 - No changes     ENT and Mouth Exam  Esophagitis: 0 - None  Cardiovascular  Respiratory effort: 1 - Normal - without distress  Gastrointestinal  Nausea: 0 - None     Psychosocial  Mood - Anxiety: 0 - Normal  Mood - Depression: 0 - Normal  Psychosocial Note: energy level at baseline  Pain Assessment  0-10 Pain Scale: 0      Objective:   BP (!) 149/68   Pulse 87   Resp 16   Wt 67.2 kg (148 lb 1.6 oz)   Gen: Appears well, in no acute distress  Skin: No erythema  CV/Resp: rrr, breathing comfortably on room air  Neuro: CN 2-12 grossly intact, UE/LE full strength     Labs:  CBC RESULTS: No results for input(s): WBC, RBC, HGB, HCT, MCV, MCH, MCHC, RDW, PLT in the last 08605 hours.  ELECTROLYTES:  No results for input(s): NA, POTASSIUM, CHLORIDE, ISA, CO2, BUN, CR, GLC in the last 13153 hours.    Assessment:    Tolerating radiation therapy well.  All questions  and concerns addressed.      Plan:   1. Continue current therapy.        Mosaiq chart and setup information reviewed  Ports checked and MVCT/IGRT images checked    Medication Review  Med list reviewed with patient?: Yes    Educational Topic Discussed  Additional Instructions: cycle 2 of chemotherapy scheduled 12/7/2022 - 12/9/2022  Education Instructions: radiation therapy side effects: fatigue, skin changes and skin cares, cough/shortness of breath, pain/difficulty with swallowing    Imtiaz Cota MD  Radiation Oncology   [facility]  Clinic: [phone]

## 2022-12-15 ENCOUNTER — APPOINTMENT (OUTPATIENT)
Dept: RADIATION ONCOLOGY | Facility: CLINIC | Age: 87
End: 2022-12-15
Payer: MEDICARE

## 2022-12-15 PROCEDURE — 77386 PR IMRT TREATMENT DELIVERY, COMPLEX: CPT | Performed by: SURGERY

## 2022-12-15 PROCEDURE — 77014 PR CT GUIDE FOR PLACEMENT RADIATION THERAPY FIELDS: CPT | Performed by: SURGERY

## 2022-12-16 ENCOUNTER — APPOINTMENT (OUTPATIENT)
Dept: RADIATION ONCOLOGY | Facility: CLINIC | Age: 87
End: 2022-12-16
Payer: MEDICARE

## 2022-12-16 PROCEDURE — 77386 PR IMRT TREATMENT DELIVERY, COMPLEX: CPT | Performed by: SURGERY

## 2022-12-16 PROCEDURE — 77014 PR CT GUIDE FOR PLACEMENT RADIATION THERAPY FIELDS: CPT | Performed by: SURGERY

## 2022-12-19 ENCOUNTER — APPOINTMENT (OUTPATIENT)
Dept: RADIATION ONCOLOGY | Facility: CLINIC | Age: 87
End: 2022-12-19
Payer: MEDICARE

## 2022-12-19 PROCEDURE — 77014 PR CT GUIDE FOR PLACEMENT RADIATION THERAPY FIELDS: CPT | Performed by: RADIOLOGY

## 2022-12-19 PROCEDURE — 77386 PR IMRT TREATMENT DELIVERY, COMPLEX: CPT | Performed by: RADIOLOGY

## 2022-12-20 ENCOUNTER — OFFICE VISIT (OUTPATIENT)
Dept: RADIATION ONCOLOGY | Facility: CLINIC | Age: 87
End: 2022-12-20
Payer: MEDICARE

## 2022-12-20 ENCOUNTER — APPOINTMENT (OUTPATIENT)
Dept: RADIATION ONCOLOGY | Facility: CLINIC | Age: 87
End: 2022-12-20
Payer: MEDICARE

## 2022-12-20 VITALS
WEIGHT: 147.6 LBS | DIASTOLIC BLOOD PRESSURE: 78 MMHG | SYSTOLIC BLOOD PRESSURE: 162 MMHG | RESPIRATION RATE: 18 BRPM | HEART RATE: 94 BPM | OXYGEN SATURATION: 98 % | TEMPERATURE: 97.8 F

## 2022-12-20 DIAGNOSIS — C34.91 SMALL CELL CARCINOMA OF RIGHT LUNG (H): Primary | ICD-10-CM

## 2022-12-20 PROCEDURE — 99207 PR DROP WITH A PROCEDURE: CPT | Performed by: SURGERY

## 2022-12-20 PROCEDURE — 77014 PR CT GUIDE FOR PLACEMENT RADIATION THERAPY FIELDS: CPT | Performed by: SURGERY

## 2022-12-20 PROCEDURE — 77386 PR IMRT TREATMENT DELIVERY, COMPLEX: CPT | Performed by: SURGERY

## 2022-12-20 ASSESSMENT — PAIN SCALES - GENERAL: PAINLEVEL: NO PAIN (0)
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
## 2022-12-20 NOTE — PATIENT INSTRUCTIONS
Please contact [facility] RN with questions or concerns following today's appointment: [phone].    Thank you!
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
## 2022-12-26 ENCOUNTER — APPOINTMENT (OUTPATIENT)
Dept: RADIATION ONCOLOGY | Facility: CLINIC | Age: 87
End: 2022-12-26
Payer: MEDICARE

## 2022-12-26 PROCEDURE — 77427 RADIATION TX MANAGEMENT X5: CPT | Performed by: SURGERY

## 2022-12-26 PROCEDURE — 77014 PR CT GUIDE FOR PLACEMENT RADIATION THERAPY FIELDS: CPT | Performed by: RADIOLOGY

## 2022-12-26 PROCEDURE — 77386 PR IMRT TREATMENT DELIVERY, COMPLEX: CPT | Performed by: RADIOLOGY

## 2022-12-26 PROCEDURE — 77336 RADIATION PHYSICS CONSULT: CPT | Performed by: SURGERY

## 2022-12-27 ENCOUNTER — APPOINTMENT (OUTPATIENT)
Dept: RADIATION ONCOLOGY | Facility: CLINIC | Age: 87
End: 2022-12-27
Payer: MEDICARE

## 2022-12-27 PROCEDURE — 77014 PR CT GUIDE FOR PLACEMENT RADIATION THERAPY FIELDS: CPT | Performed by: RADIOLOGY

## 2022-12-27 PROCEDURE — 77386 PR IMRT TREATMENT DELIVERY, COMPLEX: CPT | Performed by: RADIOLOGY

## 2022-12-28 ENCOUNTER — OFFICE VISIT (OUTPATIENT)
Dept: RADIATION ONCOLOGY | Facility: CLINIC | Age: 87
End: 2022-12-28
Payer: MEDICARE

## 2022-12-28 ENCOUNTER — APPOINTMENT (OUTPATIENT)
Dept: RADIATION ONCOLOGY | Facility: CLINIC | Age: 87
End: 2022-12-28
Payer: MEDICARE

## 2022-12-28 VITALS
TEMPERATURE: 97.8 F | WEIGHT: 152 LBS | HEART RATE: 100 BPM | OXYGEN SATURATION: 98 % | SYSTOLIC BLOOD PRESSURE: 146 MMHG | RESPIRATION RATE: 18 BRPM | DIASTOLIC BLOOD PRESSURE: 67 MMHG

## 2022-12-28 DIAGNOSIS — C34.91 SMALL CELL CARCINOMA OF RIGHT LUNG (H): Primary | ICD-10-CM

## 2022-12-28 PROCEDURE — 99207 PR DROP WITH A PROCEDURE: CPT | Performed by: RADIOLOGY

## 2022-12-28 PROCEDURE — 77014 PR CT GUIDE FOR PLACEMENT RADIATION THERAPY FIELDS: CPT | Performed by: RADIOLOGY

## 2022-12-28 PROCEDURE — 77386 PR IMRT TREATMENT DELIVERY, COMPLEX: CPT | Performed by: RADIOLOGY

## 2022-12-28 ASSESSMENT — PAIN SCALES - GENERAL: PAINLEVEL: NO PAIN (0)

## 2022-12-28 NOTE — LETTER
2022         RE: Penelope Cisneros  [address] [address]        Dear Colleague,    Thank you for referring your patient, Penelope Cisneros, to the [facility] RADIATION ONCOLOGY [place]. Please see a copy of my visit note below.    [facility] PHYSICIANS  SPECIALIZING IN BREAKTHROUGHS  Radiation Oncology    On Treatment Visit Note      Penelope Cisneros      Date: 2022   MRN: [id number]   : 1935  Diagnosis: limited stage small cell lung cancer      Reason for Visit:  On Radiation Treatment Visit     Treatment Summary to Date  Treatment Site: R[facility], [facility] Current Dose: 3400/6600 cGy Fractions:       Chemotherapy  Chemo concurrent with radx?: Yes  Oncologist: Dr. Banuelos  Drug Name/Frequency 1: Carbo/Etoposide    Subjective:     Doing well with no complaints    Nursing ROS:   Nutrition Alteration  Diet Type: Patient's Preference  Skin  Skin Reaction: 0 - No changes     ENT and Mouth Exam  Esophagitis: 0 - None  Cardiovascular  Respiratory effort: 1 - Normal - without distress  Gastrointestinal  Nausea: 0 - None     Psychosocial  Mood - Anxiety: 0 - Normal  Mood - Depression: 0 - Normal  Psychosocial Note: slight fatigue per patient report  Pain Assessment  0-10 Pain Scale: 0      Objective:   BP (!) 146/67 (BP Location: Left arm, Patient Position: Chair, Cuff Size: Adult Regular)   Pulse 100   Temp 97.8  F (36.6  C) (Oral)   Resp 18   Wt 68.9 kg (152 lb)   SpO2 98%   Gen: Appears well, in no acute distress  Skin: No erythema    Labs:  CBC RESULTS: No results for input(s): WBC, RBC, HGB, HCT, MCV, MCH, MCHC, RDW, PLT in the last 05575 hours.  ELECTROLYTES:  No results for input(s): NA, POTASSIUM, CHLORIDE, ISA, CO2, BUN, CR, GLC in the last 95579 hours.    Assessment:    Tolerating radiation therapy well.  All questions and concerns addressed.    Plan:   1. Continue current therapy.    2. Chemo per med onc      Mosaiq chart and setup information  reviewed  MVCT/IGRT images checked    Medication Review  Med list reviewed with patient?: Yes    Educational Topic Discussed  Additional Instructions: cycle 3 of chemotherapy 12/28/2022 - 12/30/2022, cycle 4 scheduled 1/18/2023 - 1/20/2023  Education Instructions: radiation therapy side effects: fatigue, skin changes and skin cares, cough/shortness of breath, pain/difficulty with swallowing        Horacio Gonzalez MD    Please do not send letter to referring physician.          Again, thank you for allowing me to participate in the care of your patient.        Sincerely,        GUIDO Gonzalez MD

## 2022-12-28 NOTE — PATIENT INSTRUCTIONS
Please contact [facility] RN with questions or concerns following today's appointment: [phone].    Thank you!

## 2022-12-28 NOTE — PROGRESS NOTES
[facility] PHYSICIANS  SPECIALIZING IN BREAKTHROUGHS  Radiation Oncology    On Treatment Visit Note      Penelope Cisneros      Date: 2022   MRN: [id number]   : 1935  Diagnosis: limited stage small cell lung cancer      Reason for Visit:  On Radiation Treatment Visit     Treatment Summary to Date  Treatment Site: R[facility], [facility] Current Dose: 3400/6600 cGy Fractions:       Chemotherapy  Chemo concurrent with radx?: Yes  Oncologist: Dr. Banuelos  Drug Name/Frequency 1: Carbo/Etoposide    Subjective:     Doing well with no complaints    Nursing ROS:   Nutrition Alteration  Diet Type: Patient's Preference  Skin  Skin Reaction: 0 - No changes     ENT and Mouth Exam  Esophagitis: 0 - None  Cardiovascular  Respiratory effort: 1 - Normal - without distress  Gastrointestinal  Nausea: 0 - None     Psychosocial  Mood - Anxiety: 0 - Normal  Mood - Depression: 0 - Normal  Psychosocial Note: slight fatigue per patient report  Pain Assessment  0-10 Pain Scale: 0      Objective:   BP (!) 146/67 (BP Location: Left arm, Patient Position: Chair, Cuff Size: Adult Regular)   Pulse 100   Temp 97.8  F (36.6  C) (Oral)   Resp 18   Wt 68.9 kg (152 lb)   SpO2 98%   Gen: Appears well, in no acute distress  Skin: No erythema    Labs:  CBC RESULTS: No results for input(s): WBC, RBC, HGB, HCT, MCV, MCH, MCHC, RDW, PLT in the last 92545 hours.  ELECTROLYTES:  No results for input(s): NA, POTASSIUM, CHLORIDE, ISA, CO2, BUN, CR, GLC in the last 71144 hours.    Assessment:    Tolerating radiation therapy well.  All questions and concerns addressed.    Plan:   1. Continue current therapy.    2. Chemo per med onc      Mosaiq chart and setup information reviewed  MVCT/IGRT images checked    Medication Review  Med list reviewed with patient?: Yes    Educational Topic Discussed  Additional Instructions: cycle 3 of chemotherapy 2022 - 2022, cycle 4 scheduled 2023 - 2023  Education Instructions: radiation  therapy side effects: fatigue, skin changes and skin cares, cough/shortness of breath, pain/difficulty with swallowing        Horacio Gonzalez MD    Please do not send letter to referring physician.

## 2022-12-29 ENCOUNTER — APPOINTMENT (OUTPATIENT)
Dept: RADIATION ONCOLOGY | Facility: CLINIC | Age: 87
End: 2022-12-29
Payer: MEDICARE

## 2022-12-29 PROCEDURE — 77386 PR IMRT TREATMENT DELIVERY, COMPLEX: CPT | Performed by: RADIOLOGY

## 2022-12-29 PROCEDURE — 77014 PR CT GUIDE FOR PLACEMENT RADIATION THERAPY FIELDS: CPT | Performed by: RADIOLOGY

## 2022-12-30 ENCOUNTER — APPOINTMENT (OUTPATIENT)
Dept: RADIATION ONCOLOGY | Facility: CLINIC | Age: 87
End: 2022-12-30
Payer: MEDICARE

## 2022-12-30 PROCEDURE — 77014 PR CT GUIDE FOR PLACEMENT RADIATION THERAPY FIELDS: CPT | Performed by: RADIOLOGY

## 2022-12-30 PROCEDURE — 77386 PR IMRT TREATMENT DELIVERY, COMPLEX: CPT | Performed by: RADIOLOGY

## 2023-01-02 ENCOUNTER — APPOINTMENT (OUTPATIENT)
Dept: RADIATION ONCOLOGY | Facility: CLINIC | Age: 88
End: 2023-01-02
Payer: MEDICARE

## 2023-01-02 PROCEDURE — 77427 RADIATION TX MANAGEMENT X5: CPT | Performed by: RADIOLOGY

## 2023-01-02 PROCEDURE — 77014 PR CT GUIDE FOR PLACEMENT RADIATION THERAPY FIELDS: CPT | Performed by: RADIOLOGY

## 2023-01-02 PROCEDURE — 77386 PR IMRT TREATMENT DELIVERY, COMPLEX: CPT | Performed by: RADIOLOGY

## 2023-01-02 PROCEDURE — 77336 RADIATION PHYSICS CONSULT: CPT | Performed by: RADIOLOGY

## 2023-01-03 ENCOUNTER — APPOINTMENT (OUTPATIENT)
Dept: RADIATION ONCOLOGY | Facility: CLINIC | Age: 88
End: 2023-01-03
Payer: MEDICARE

## 2023-01-03 PROCEDURE — 77014 PR CT GUIDE FOR PLACEMENT RADIATION THERAPY FIELDS: CPT | Performed by: SURGERY

## 2023-01-03 PROCEDURE — 77386 PR IMRT TREATMENT DELIVERY, COMPLEX: CPT | Performed by: SURGERY

## 2023-01-04 ENCOUNTER — OFFICE VISIT (OUTPATIENT)
Dept: RADIATION ONCOLOGY | Facility: CLINIC | Age: 88
End: 2023-01-04
Payer: MEDICARE

## 2023-01-04 ENCOUNTER — APPOINTMENT (OUTPATIENT)
Dept: RADIATION ONCOLOGY | Facility: CLINIC | Age: 88
End: 2023-01-04
Payer: MEDICARE

## 2023-01-04 VITALS
WEIGHT: 150.4 LBS | OXYGEN SATURATION: 100 % | DIASTOLIC BLOOD PRESSURE: 74 MMHG | SYSTOLIC BLOOD PRESSURE: 159 MMHG | RESPIRATION RATE: 18 BRPM | TEMPERATURE: 97.7 F | HEART RATE: 71 BPM

## 2023-01-04 DIAGNOSIS — C34.91 SMALL CELL CARCINOMA OF RIGHT LUNG (H): Primary | ICD-10-CM

## 2023-01-04 PROCEDURE — 77386 PR IMRT TREATMENT DELIVERY, COMPLEX: CPT | Performed by: SURGERY

## 2023-01-04 PROCEDURE — 99207 PR DROP WITH A PROCEDURE: CPT | Performed by: SURGERY

## 2023-01-04 PROCEDURE — 77014 PR CT GUIDE FOR PLACEMENT RADIATION THERAPY FIELDS: CPT | Performed by: SURGERY

## 2023-01-04 ASSESSMENT — PAIN SCALES - GENERAL: PAINLEVEL: NO PAIN (0)

## 2023-01-04 NOTE — PROGRESS NOTES
Please contact [facility] RN with questions or concerns following today's appointment: [phone].    Thank you!

## 2023-01-04 NOTE — PROGRESS NOTES
[facility] PHYSICIANS  SPECIALIZING IN BREAKTHROUGHS  Radiation Oncology    On Treatment Visit Note      Penelope Cisneros      Date: 2023   MRN: [id number]   : 1935  Diagnosis: limited stage small cell lung cancer        ID: Mr. Cisneros is a 87M with LS-SCLC, [id number] of the right upper lobe. PET/CT positive in RUL and 10R.  MRI with indeterminate lesions in right caudate and right precentral gyrus wihtout any associated flair changes, most likely felt to be ischemic related. Plan for 66Gy/33fx with concurrent chemotherapy (cycle 2 start, Dr. Banuelos, [facility]).     Reason for Visit:  On Radiation Treatment Visit       Treatment Summary to Date  Treatment Site: RUL, [place] Current Dose: 4400/6600 cGy Fractions:       Chemotherapy  Chemo concurrent with radx?: Yes  Oncologist: Dr. Banuelos  Drug Name/Frequency 1: Carbo/Etoposide    Subjective:   No complaints, tolerating RT well overall.    Nursing ROS:   Nutrition Alteration  Diet Type: Patient's Preference  Skin  Skin Reaction: 0 - No changes     ENT and Mouth Exam  Esophagitis: 0 - None  Cardiovascular  Respiratory effort: 1 - Normal - without distress  Gastrointestinal  Nausea: 0 - None     Psychosocial  Mood - Anxiety: 0 - Normal  Mood - Depression: 0 - Normal  Psychosocial Note: increasing fatigue per patient, napping daily  Pain Assessment  0-10 Pain Scale: 0      Objective:   BP (!) 159/74 (BP Location: Left arm, Patient Position: Chair, Cuff Size: Adult Regular)   Pulse 71   Temp 97.7  F (36.5  C) (Oral)   Resp 18   Wt 68.2 kg (150 lb 6.4 oz)   SpO2 100%   Gen: Appears well, in no acute distress  Skin: No erythema  CV/Resp: rrr, breathing comfortably on room air  Neuro: CN 2-12 grossly intact, UE/LE full strength     Labs:  CBC RESULTS: No results for input(s): WBC, RBC, HGB, HCT, MCV, MCH, MCHC, RDW, PLT in the last 44827 hours.  ELECTROLYTES:  No results for input(s): NA, POTASSIUM, CHLORIDE, ISA, CO2, BUN, CR,  GLC in the last 19500 hours.    Assessment:    Tolerating radiation therapy well.  All questions and concerns addressed.      Plan:   1. Continue current therapy.        Mosaiq chart and setup information reviewed  Ports checked and MVCT/IGRT images checked    Medication Review  Med list reviewed with patient?: Yes    Educational Topic Discussed  Additional Instructions: cycle 3 of chemotherapy 12/28/2022 - 12/30/2022, cycle 4 scheduled 1/18/2023 - 1/20/2023  Education Instructions: radiation therapy side effects: fatigue, skin changes and skin cares, cough/shortness of breath, pain/difficulty with swallowing    Imtiaz Cota MD  Radiation Oncology   [facility]  Clinic: [phone]

## 2023-01-04 NOTE — LETTER
2023         RE: Penelope Cisneros  [address] [address]        Dear Colleague,    Thank you for referring your patient, Penelope Cisneros, to the [facility] RADIATION ONCOLOGY [place]. Please see a copy of my visit note below.    Please contact [facility] RN with questions or concerns following today's appointment: [phone].    Thank you!      [facility] PHYSICIANS  SPECIALIZING IN BREAKTHROUGHS  Radiation Oncology    On Treatment Visit Note      Penelope Cisneros      Date: 2023   MRN: [id number]   : 1935  Diagnosis: limited stage small cell lung cancer        ID: Mr. Cisneros is a 87M with LS-SCLC, [id number] of the right upper lobe. PET/CT positive in RUL and 10R.  MRI with indeterminate lesions in right caudate and right precentral gyrus wihtout any associated flair changes, most likely felt to be ischemic related. Plan for 66Gy/33fx with concurrent chemotherapy (cycle 2 start, Dr. Banuelos, [facility]).     Reason for Visit:  On Radiation Treatment Visit       Treatment Summary to Date  Treatment Site: RUL, [place] Current Dose: 4400/6600 cGy Fractions:       Chemotherapy  Chemo concurrent with radx?: Yes  Oncologist: Dr. Banuelos  Drug Name/Frequency 1: Carbo/Etoposide    Subjective:   No complaints, tolerating RT well overall.    Nursing ROS:   Nutrition Alteration  Diet Type: Patient's Preference  Skin  Skin Reaction: 0 - No changes     ENT and Mouth Exam  Esophagitis: 0 - None  Cardiovascular  Respiratory effort: 1 - Normal - without distress  Gastrointestinal  Nausea: 0 - None     Psychosocial  Mood - Anxiety: 0 - Normal  Mood - Depression: 0 - Normal  Psychosocial Note: increasing fatigue per patient, napping daily  Pain Assessment  0-10 Pain Scale: 0      Objective:   BP (!) 159/74 (BP Location: Left arm, Patient Position: Chair, Cuff Size: Adult Regular)   Pulse 71   Temp 97.7  F (36.5  C) (Oral)   Resp 18   Wt  68.2 kg (150 lb 6.4 oz)   SpO2 100%   Gen: Appears well, in no acute distress  Skin: No erythema  CV/Resp: rrr, breathing comfortably on room air  Neuro: CN 2-12 grossly intact, UE/LE full strength     Labs:  CBC RESULTS: No results for input(s): WBC, RBC, HGB, HCT, MCV, MCH, MCHC, RDW, PLT in the last 48599 hours.  ELECTROLYTES:  No results for input(s): NA, POTASSIUM, CHLORIDE, ISA, CO2, BUN, CR, GLC in the last 80488 hours.    Assessment:    Tolerating radiation therapy well.  All questions and concerns addressed.      Plan:   1. Continue current therapy.        Mosaiq chart and setup information reviewed  Ports checked and MVCT/IGRT images checked    Medication Review  Med list reviewed with patient?: Yes    Educational Topic Discussed  Additional Instructions: cycle 3 of chemotherapy 12/28/2022 - 12/30/2022, cycle 4 scheduled 1/18/2023 - 1/20/2023  Education Instructions: radiation therapy side effects: fatigue, skin changes and skin cares, cough/shortness of breath, pain/difficulty with swallowing    Imtiaz Cota MD  Radiation Oncology   [facility]  Clinic: [phone]            Again, thank you for allowing me to participate in the care of your patient.        Sincerely,        Imtiaz Cota MD

## 2023-01-04 NOTE — PATIENT INSTRUCTIONS
Please contact [facility] RN with questions or concerns following today's appointment: [phone].    Thank you!

## 2023-01-05 ENCOUNTER — APPOINTMENT (OUTPATIENT)
Dept: RADIATION ONCOLOGY | Facility: CLINIC | Age: 88
End: 2023-01-05
Payer: MEDICARE

## 2023-01-05 PROCEDURE — 77014 PR CT GUIDE FOR PLACEMENT RADIATION THERAPY FIELDS: CPT | Performed by: SURGERY

## 2023-01-05 PROCEDURE — 77386 PR IMRT TREATMENT DELIVERY, COMPLEX: CPT | Performed by: SURGERY

## 2023-01-06 ENCOUNTER — APPOINTMENT (OUTPATIENT)
Dept: RADIATION ONCOLOGY | Facility: CLINIC | Age: 88
End: 2023-01-06
Payer: MEDICARE

## 2023-01-06 PROCEDURE — 77386 PR IMRT TREATMENT DELIVERY, COMPLEX: CPT | Performed by: SURGERY

## 2023-01-06 PROCEDURE — 77014 PR CT GUIDE FOR PLACEMENT RADIATION THERAPY FIELDS: CPT | Performed by: SURGERY

## 2023-01-09 ENCOUNTER — APPOINTMENT (OUTPATIENT)
Dept: RADIATION ONCOLOGY | Facility: CLINIC | Age: 88
End: 2023-01-09
Payer: MEDICARE

## 2023-01-09 PROCEDURE — 77386 PR IMRT TREATMENT DELIVERY, COMPLEX: CPT | Performed by: RADIOLOGY

## 2023-01-09 PROCEDURE — 77336 RADIATION PHYSICS CONSULT: CPT | Performed by: SURGERY

## 2023-01-09 PROCEDURE — 77427 RADIATION TX MANAGEMENT X5: CPT | Performed by: SURGERY

## 2023-01-09 PROCEDURE — 77014 PR CT GUIDE FOR PLACEMENT RADIATION THERAPY FIELDS: CPT | Performed by: RADIOLOGY

## 2023-01-10 ENCOUNTER — APPOINTMENT (OUTPATIENT)
Dept: RADIATION ONCOLOGY | Facility: CLINIC | Age: 88
End: 2023-01-10
Payer: MEDICARE

## 2023-01-10 PROCEDURE — 77014 PR CT GUIDE FOR PLACEMENT RADIATION THERAPY FIELDS: CPT | Performed by: SURGERY

## 2023-01-10 PROCEDURE — 77386 PR IMRT TREATMENT DELIVERY, COMPLEX: CPT | Performed by: SURGERY

## 2023-01-11 ENCOUNTER — OFFICE VISIT (OUTPATIENT)
Dept: RADIATION ONCOLOGY | Facility: CLINIC | Age: 88
End: 2023-01-11
Payer: MEDICARE

## 2023-01-11 ENCOUNTER — APPOINTMENT (OUTPATIENT)
Dept: RADIATION ONCOLOGY | Facility: CLINIC | Age: 88
End: 2023-01-11
Payer: MEDICARE

## 2023-01-11 VITALS
SYSTOLIC BLOOD PRESSURE: 159 MMHG | HEART RATE: 84 BPM | WEIGHT: 150.2 LBS | TEMPERATURE: 98.4 F | DIASTOLIC BLOOD PRESSURE: 63 MMHG | OXYGEN SATURATION: 100 % | RESPIRATION RATE: 18 BRPM

## 2023-01-11 DIAGNOSIS — C34.91 SMALL CELL CARCINOMA OF RIGHT LUNG (H): Primary | ICD-10-CM

## 2023-01-11 PROCEDURE — 99207 PR DROP WITH A PROCEDURE: CPT | Performed by: SURGERY

## 2023-01-11 PROCEDURE — 77386 PR IMRT TREATMENT DELIVERY, COMPLEX: CPT | Performed by: SURGERY

## 2023-01-11 PROCEDURE — 77014 PR CT GUIDE FOR PLACEMENT RADIATION THERAPY FIELDS: CPT | Performed by: SURGERY

## 2023-01-11 ASSESSMENT — PAIN SCALES - GENERAL: PAINLEVEL: NO PAIN (0)

## 2023-01-11 NOTE — LETTER
2023         RE: Penelope Cisneros  [address] [address]        Dear Colleague,    Thank you for referring your patient, Penelope Cisneros, to the [facility] RADIATION ONCOLOGY [place]. Please see a copy of my visit note below.    [facility] PHYSICIANS  SPECIALIZING IN BREAKTHROUGHS  Radiation Oncology    On Treatment Visit Note      Penelope Cisneros      Date: 2023   MRN: [id number]   : 1935  Diagnosis: limited stage small cell lung cancer        ID: Mr. Cisneros is a 87M with LS-SCLC, [id number] of the right upper lobe. PET/CT positive in RUL and 10R.  MRI with indeterminate lesions in right caudate and right precentral gyrus wihtout any associated flair changes, most likely felt to be ischemic related. Plan for 66Gy/33fx with concurrent chemotherapy (cycle 2 start, Dr. Banuelos, [facility]).     Reason for Visit:  On Radiation Treatment Visit       Treatment Summary to Date  Treatment Site: RUL, [place] Current Dose: 5400/6600 cGy Fractions:       Chemotherapy  Chemo concurrent with radx?: Yes  Oncologist: Dr. Banuelos  Drug Name/Frequency 1: Carbo/Etoposide    Subjective:   No complaints, tolerating RT well overall.    Nursing ROS:   Nutrition Alteration  Diet Type: Patient's Preference  Skin  Skin Reaction: 0 - No changes     ENT and Mouth Exam  Esophagitis: 0 - None  Cardiovascular  Respiratory effort: 1 - Normal - without distress  Gastrointestinal  Nausea: 0 - None     Psychosocial  Mood - Anxiety: 0 - Normal  Mood - Depression: 0 - Normal  Psychosocial Note: increasing fatigue per patient, napping daily  Pain Assessment  0-10 Pain Scale: 0      Objective:   BP (!) 159/63 (BP Location: Left arm, Patient Position: Chair, Cuff Size: Adult Regular)   Pulse 84   Temp 98.4  F (36.9  C) (Oral)   Resp 18   Wt 68.1 kg (150 lb 3.2 oz)   SpO2 100%   Gen: Appears well, in no acute distress  Skin: No erythema  CV/Resp: rrr, breathing comfortably on  room air  Neuro: CN 2-12 grossly intact, UE/LE full strength     Labs:  CBC RESULTS: No results for input(s): WBC, RBC, HGB, HCT, MCV, MCH, MCHC, RDW, PLT in the last 22093 hours.  ELECTROLYTES:  No results for input(s): NA, POTASSIUM, CHLORIDE, ISA, CO2, BUN, CR, GLC in the last 55359 hours.    Assessment:    Tolerating radiation therapy well.  All questions and concerns addressed.      Plan:   1. Continue current therapy.        Mosaiq chart and setup information reviewed  Ports checked and MVCT/IGRT images checked    Medication Review  Med list reviewed with patient?: Yes  Med list printed and given: Offered and declined    Educational Topic Discussed  Additional Instructions: cycle 4 scheduled 1/18/2023 - 1/20/2023  Education Instructions: radiation therapy side effects: fatigue, skin changes and skin cares, cough/shortness of breath, pain/difficulty with swallowing    Imtiaz Cota MD  Radiation Oncology   [facility]  Clinic: [phone]            Again, thank you for allowing me to participate in the care of your patient.        Sincerely,        Imtiaz Cota MD

## 2023-01-11 NOTE — PROGRESS NOTES
[facility] PHYSICIANS  SPECIALIZING IN BREAKTHROUGHS  Radiation Oncology    On Treatment Visit Note      Penelope Cisneros      Date: 2023   MRN: [id number]   : 1935  Diagnosis: limited stage small cell lung cancer        ID: Mr. Cisneros is a 87M with LS-SCLC, [id number] of the right upper lobe. PET/CT positive in RUL and 10R.  MRI with indeterminate lesions in right caudate and right precentral gyrus wihtout any associated flair changes, most likely felt to be ischemic related. Plan for 66Gy/33fx with concurrent chemotherapy (cycle 2 start, Dr. Banuelos, [facility]).     Reason for Visit:  On Radiation Treatment Visit       Treatment Summary to Date  Treatment Site: RUL, [place] Current Dose: 5400/6600 cGy Fractions:       Chemotherapy  Chemo concurrent with radx?: Yes  Oncologist: Dr. Banuelos  Drug Name/Frequency 1: Carbo/Etoposide    Subjective:   No complaints, tolerating RT well overall.    Nursing ROS:   Nutrition Alteration  Diet Type: Patient's Preference  Skin  Skin Reaction: 0 - No changes     ENT and Mouth Exam  Esophagitis: 0 - None  Cardiovascular  Respiratory effort: 1 - Normal - without distress  Gastrointestinal  Nausea: 0 - None     Psychosocial  Mood - Anxiety: 0 - Normal  Mood - Depression: 0 - Normal  Psychosocial Note: increasing fatigue per patient, napping daily  Pain Assessment  0-10 Pain Scale: 0      Objective:   BP (!) 159/63 (BP Location: Left arm, Patient Position: Chair, Cuff Size: Adult Regular)   Pulse 84   Temp 98.4  F (36.9  C) (Oral)   Resp 18   Wt 68.1 kg (150 lb 3.2 oz)   SpO2 100%   Gen: Appears well, in no acute distress  Skin: No erythema  CV/Resp: rrr, breathing comfortably on room air  Neuro: CN 2-12 grossly intact, UE/LE full strength     Labs:  CBC RESULTS: No results for input(s): WBC, RBC, HGB, HCT, MCV, MCH, MCHC, RDW, PLT in the last 57872 hours.  ELECTROLYTES:  No results for input(s): NA, POTASSIUM, CHLORIDE, ISA, CO2, BUN, CR,  GLC in the last 12894 hours.    Assessment:    Tolerating radiation therapy well.  All questions and concerns addressed.      Plan:   1. Continue current therapy.        Mosaiq chart and setup information reviewed  Ports checked and MVCT/IGRT images checked    Medication Review  Med list reviewed with patient?: Yes  Med list printed and given: Offered and declined    Educational Topic Discussed  Additional Instructions: cycle 4 scheduled 1/18/2023 - 1/20/2023  Education Instructions: radiation therapy side effects: fatigue, skin changes and skin cares, cough/shortness of breath, pain/difficulty with swallowing    Imtiaz Cota MD  Radiation Oncology   [facility]  Clinic: [phone]

## 2023-01-11 NOTE — PATIENT INSTRUCTIONS
Please contact [facility] RN with questions or concerns following today's appointment: [phone].    Thank you!

## 2023-01-12 ENCOUNTER — APPOINTMENT (OUTPATIENT)
Dept: RADIATION ONCOLOGY | Facility: CLINIC | Age: 88
End: 2023-01-12
Payer: MEDICARE

## 2023-01-12 PROCEDURE — 77014 PR CT GUIDE FOR PLACEMENT RADIATION THERAPY FIELDS: CPT | Performed by: SURGERY

## 2023-01-12 PROCEDURE — 77386 PR IMRT TREATMENT DELIVERY, COMPLEX: CPT | Performed by: SURGERY

## 2023-01-13 ENCOUNTER — APPOINTMENT (OUTPATIENT)
Dept: RADIATION ONCOLOGY | Facility: CLINIC | Age: 88
End: 2023-01-13
Payer: MEDICARE

## 2023-01-13 PROCEDURE — 77014 PR CT GUIDE FOR PLACEMENT RADIATION THERAPY FIELDS: CPT | Performed by: SURGERY

## 2023-01-13 PROCEDURE — 77386 PR IMRT TREATMENT DELIVERY, COMPLEX: CPT | Performed by: SURGERY

## 2023-01-17 ENCOUNTER — APPOINTMENT (OUTPATIENT)
Dept: RADIATION ONCOLOGY | Facility: CLINIC | Age: 88
End: 2023-01-17
Payer: MEDICARE

## 2023-01-17 PROCEDURE — 77427 RADIATION TX MANAGEMENT X5: CPT | Performed by: SURGERY

## 2023-01-17 PROCEDURE — 77386 PR IMRT TREATMENT DELIVERY, COMPLEX: CPT | Performed by: SURGERY

## 2023-01-17 PROCEDURE — 77014 PR CT GUIDE FOR PLACEMENT RADIATION THERAPY FIELDS: CPT | Performed by: SURGERY

## 2023-01-17 PROCEDURE — 77336 RADIATION PHYSICS CONSULT: CPT | Performed by: SURGERY

## 2023-01-18 ENCOUNTER — TRANSFERRED RECORDS (OUTPATIENT)
Dept: HEALTH INFORMATION MANAGEMENT | Facility: CLINIC | Age: 88
End: 2023-01-18

## 2023-01-18 ENCOUNTER — APPOINTMENT (OUTPATIENT)
Dept: RADIATION ONCOLOGY | Facility: CLINIC | Age: 88
End: 2023-01-18
Payer: MEDICARE

## 2023-01-18 ENCOUNTER — OFFICE VISIT (OUTPATIENT)
Dept: RADIATION ONCOLOGY | Facility: CLINIC | Age: 88
End: 2023-01-18
Payer: MEDICARE

## 2023-01-18 VITALS
DIASTOLIC BLOOD PRESSURE: 58 MMHG | SYSTOLIC BLOOD PRESSURE: 154 MMHG | HEART RATE: 95 BPM | OXYGEN SATURATION: 98 % | WEIGHT: 154.6 LBS | TEMPERATURE: 98 F | RESPIRATION RATE: 20 BRPM

## 2023-01-18 DIAGNOSIS — C34.91 SMALL CELL CARCINOMA OF RIGHT LUNG (H): Primary | ICD-10-CM

## 2023-01-18 PROCEDURE — 99207 PR DROP WITH A PROCEDURE: CPT | Performed by: SURGERY

## 2023-01-18 PROCEDURE — 77386 PR IMRT TREATMENT DELIVERY, COMPLEX: CPT | Performed by: SURGERY

## 2023-01-18 PROCEDURE — 77014 PR CT GUIDE FOR PLACEMENT RADIATION THERAPY FIELDS: CPT | Performed by: SURGERY

## 2023-01-18 ASSESSMENT — PAIN SCALES - GENERAL: PAINLEVEL: NO PAIN (0)

## 2023-01-18 NOTE — PATIENT INSTRUCTIONS
Chronic. Currently no pain. Had cervical spine fusion 5 years. Very pleased with results.   Please contact [facility] RN with questions or concerns following today's appointment: [phone].    Thank you!

## 2023-01-18 NOTE — LETTER
2023         RE: Penelope Cisneros  [address] [address]        Dear Colleague,    Thank you for referring your patient, Penelope Cisneros, to the [facility] RADIATION ONCOLOGY [place]. Please see a copy of my visit note below.    [facility] PHYSICIANS  SPECIALIZING IN BREAKTHROUGHS  Radiation Oncology    On Treatment Visit Note      Penelope Cisneros      Date: 2023   MRN: [id number]   : 1935  Diagnosis: limited stage small cell lung cancer        ID: Mr. Cisneros is a 87M with LS-SCLC, [id number] of the right upper lobe. PET/CT positive in RUL and 10R.  MRI with indeterminate lesions in right caudate and right precentral gyrus wihtout any associated flair changes, most likely felt to be ischemic related. Plan for 66Gy/33fx with concurrent chemotherapy (cycle 2 start, Dr. Banuelos, [facility]).        Reason for Visit:  On Radiation Treatment Visit       Treatment Summary to Date  Treatment Site: RUL, [place] Current Dose: 6200/6600 cGy Fractions: 33      Chemotherapy  Chemo concurrent with radx?: Yes  Oncologist: Dr. Banuelos  Drug Name/Frequency 1: Carbo/Etoposide    Subjective:   No complaints, tolerating RT well overall. Mild fatigue. Weight stable.    Nursing ROS:   Nutrition Alteration  Diet Type: Patient's Preference  Skin  Skin Reaction: 0 - No changes     ENT and Mouth Exam  Esophagitis: 0 - None  Cardiovascular  Respiratory effort: 1 - Normal - without distress  Gastrointestinal  Nausea: 0 - None     Psychosocial  Mood - Anxiety: 0 - Normal  Mood - Depression: 0 - Normal  Psychosocial Note: increasing fatigue per patient, napping daily  Pain Assessment  0-10 Pain Scale: 0      Objective:   BP (!) 154/58 (BP Location: Left arm, Patient Position: Chair, Cuff Size: Adult Regular)   Pulse 95   Temp 98  F (36.7  C) (Oral)   Resp 20   Wt 70.1 kg (154 lb 9.6 oz)   SpO2 98%   Gen: Appears well, in no acute distress  Skin: No erythema  CV/Resp: rrr,  breathing comfortably on room air  Neuro: CN 2-12 grossly intact, UE/LE full strength     Labs:  CBC RESULTS: No results for input(s): WBC, RBC, HGB, HCT, MCV, MCH, MCHC, RDW, PLT in the last 91288 hours.  ELECTROLYTES:  No results for input(s): NA, POTASSIUM, CHLORIDE, ISA, CO2, BUN, CR, GLC in the last 49095 hours.    Assessment:    Tolerating radiation therapy well.  All questions and concerns addressed.      Plan:   1. Continue current therapy.  2. Completes RT this week  3. Follow up with Dr. Banuelos this Friday and will have PET/CT and MRI brain scheduled in 6 weeks post RT  4. Follow up with Dr. Cota in 6 weeks (after imaging and visit with Dr. Banuelos)       Mosaiq chart and setup information reviewed  Ports checked and MVCT/IGRT images checked    Medication Review  Med list reviewed with patient?: Yes  Med list printed and given: Offered and declined    Educational Topic Discussed  Additional Instructions: cycle 4 scheduled 1/18/2023 - 1/20/2023, patient will receive imaging appointments and follow-up schedule with Dr. Banuelos on 1/20/2023 and notify this RN, patient will then be scheduled to see Dr. Cota for return visit after imaging and Dr. Banuelos appointment - patient, son and wife confirmed agreement with plan  Education Instructions: radiation therapy side effects: fatigue, skin changes and skin cares, cough/shortness of breath, pain/difficulty with swallowing    Imtiaz Cota MD  Radiation Oncology   [facility]  Clinic: [phone]            Again, thank you for allowing me to participate in the care of your patient.        Sincerely,        Imtiaz Cota MD

## 2023-01-18 NOTE — PROGRESS NOTES
[facility] PHYSICIANS  SPECIALIZING IN BREAKTHROUGHS  Radiation Oncology    On Treatment Visit Note      Penelope Cisneros      Date: 2023   MRN: [id number]   : 1935  Diagnosis: limited stage small cell lung cancer        ID: Mr. Cisneros is a 87M with LS-SCLC, [id number] of the right upper lobe. PET/CT positive in RUL and 10R.  MRI with indeterminate lesions in right caudate and right precentral gyrus wihtout any associated flair changes, most likely felt to be ischemic related. Plan for 66Gy/33fx with concurrent chemotherapy (cycle 2 start, Dr. Banuelos, [facility]).        Reason for Visit:  On Radiation Treatment Visit       Treatment Summary to Date  Treatment Site: RUL, [place] Current Dose: 6200/6600 cGy Fractions:       Chemotherapy  Chemo concurrent with radx?: Yes  Oncologist: Dr. Banuelos  Drug Name/Frequency 1: Carbo/Etoposide    Subjective:   No complaints, tolerating RT well overall. Mild fatigue. Weight stable.    Nursing ROS:   Nutrition Alteration  Diet Type: Patient's Preference  Skin  Skin Reaction: 0 - No changes     ENT and Mouth Exam  Esophagitis: 0 - None  Cardiovascular  Respiratory effort: 1 - Normal - without distress  Gastrointestinal  Nausea: 0 - None     Psychosocial  Mood - Anxiety: 0 - Normal  Mood - Depression: 0 - Normal  Psychosocial Note: increasing fatigue per patient, napping daily  Pain Assessment  0-10 Pain Scale: 0      Objective:   BP (!) 154/58 (BP Location: Left arm, Patient Position: Chair, Cuff Size: Adult Regular)   Pulse 95   Temp 98  F (36.7  C) (Oral)   Resp 20   Wt 70.1 kg (154 lb 9.6 oz)   SpO2 98%   Gen: Appears well, in no acute distress  Skin: No erythema  CV/Resp: rrr, breathing comfortably on room air  Neuro: CN 2-12 grossly intact, UE/LE full strength     Labs:  CBC RESULTS: No results for input(s): WBC, RBC, HGB, HCT, MCV, MCH, MCHC, RDW, PLT in the last 96493 hours.  ELECTROLYTES:  No results for input(s): NA, POTASSIUM,  CHLORIDE, ISA, CO2, BUN, CR, GLC in the last 30048 hours.    Assessment:    Tolerating radiation therapy well.  All questions and concerns addressed.      Plan:   1. Continue current therapy.  2. Completes RT this week  3. Follow up with Dr. Banuelos this Friday and will have PET/CT and MRI brain scheduled in 6 weeks post RT  4. Follow up with Dr. Cota in 6 weeks (after imaging and visit with Dr. Banuelos)       Mosaiq chart and setup information reviewed  Ports checked and MVCT/IGRT images checked    Medication Review  Med list reviewed with patient?: Yes  Med list printed and given: Offered and declined    Educational Topic Discussed  Additional Instructions: cycle 4 scheduled 1/18/2023 - 1/20/2023, patient will receive imaging appointments and follow-up schedule with Dr. Banuelos on 1/20/2023 and notify this RN, patient will then be scheduled to see Dr. Cota for return visit after imaging and Dr. Banuelos appointment - patient, son and wife confirmed agreement with plan  Education Instructions: radiation therapy side effects: fatigue, skin changes and skin cares, cough/shortness of breath, pain/difficulty with swallowing    Imtiaz Cota MD  Radiation Oncology   [facility]  Clinic: [phone]

## 2023-01-19 ENCOUNTER — APPOINTMENT (OUTPATIENT)
Dept: RADIATION ONCOLOGY | Facility: CLINIC | Age: 88
End: 2023-01-19
Payer: MEDICARE

## 2023-01-19 PROCEDURE — 77386 PR IMRT TREATMENT DELIVERY, COMPLEX: CPT | Performed by: SURGERY

## 2023-01-19 PROCEDURE — 77014 PR CT GUIDE FOR PLACEMENT RADIATION THERAPY FIELDS: CPT | Performed by: SURGERY

## 2023-01-20 ENCOUNTER — DOCUMENTATION ONLY (OUTPATIENT)
Dept: RADIATION ONCOLOGY | Facility: CLINIC | Age: 88
End: 2023-01-20

## 2023-01-20 ENCOUNTER — APPOINTMENT (OUTPATIENT)
Dept: RADIATION ONCOLOGY | Facility: CLINIC | Age: 88
End: 2023-01-20
Payer: MEDICARE

## 2023-01-20 PROCEDURE — 77386 PR IMRT TREATMENT DELIVERY, COMPLEX: CPT | Performed by: SURGERY

## 2023-01-20 PROCEDURE — 77427 RADIATION TX MANAGEMENT X5: CPT | Performed by: SURGERY

## 2023-01-20 PROCEDURE — 77336 RADIATION PHYSICS CONSULT: CPT | Performed by: SURGERY

## 2023-01-20 PROCEDURE — 77014 PR CT GUIDE FOR PLACEMENT RADIATION THERAPY FIELDS: CPT | Performed by: SURGERY

## 2023-02-12 ENCOUNTER — HEALTH MAINTENANCE LETTER (OUTPATIENT)
Age: 88
End: 2023-02-12

## 2023-02-17 NOTE — PROGRESS NOTES
Radiotherapy Treatment Summary              PATIENT: Penelope Cisneros  MEDICAL RECORD NO: [id number]   : 1935    DIAGNOSIS / ID: Mr. Cisneros is a 87M with LS-SCLC, [id number] of the right upper lobe. PET/CT positive in RUL and 10R.  MRI with indeterminate lesions in right caudate and right precentral gyrus wihtout any associated flair changes, most likely felt to be ischemic related. Plan for 66Gy/33fx with concurrent chemotherapy (cycle 2 start, Dr. Banuelos, [facility]).      INTENT OF RADIOTHERAPY: Definitive     CONCURRENT SYSTEMIC THERAPY: Yes          SITE OF TREATMENT: Right upper lobe, hilum    DATES  OF TREATMENT: 22- 23    TOTAL DOSE OF TREATMENT / FRACTIONS: 66Gy/33fx                                         FOLLOW UP PLAN:  1. Follow up with Dr. Banuelos this Friday and will have PET/CT and MRI brain scheduled in 6 weeks post RT.   2. Follow up with Dr. Cota in 6 weeks (after imaging and visit with Dr. Banuelos) for acute toxicity check and discuss PCI versus WBRT (depending upon PET/CT and MRI results)     CC  Patient Care Team:  Jens Helms as PCP - General (Family Medicine)  Mell Banuelos MD as MD (Hematology & Oncology)  Imtiaz Cota MD as MD (Radiation Oncology)  Dyalin Arguello, RN as Specialty Care Coordinator (Radiation Oncology)  Imtiaz Cota MD as Assigned Cancer Care Provider       Imtiaz Cota M.D.  Department of Radiation Oncology  [facility]

## 2023-02-22 ENCOUNTER — TRANSFERRED RECORDS (OUTPATIENT)
Dept: RADIATION ONCOLOGY | Facility: CLINIC | Age: 88
End: 2023-02-22
Payer: MEDICARE

## 2023-02-24 ENCOUNTER — TRANSFERRED RECORDS (OUTPATIENT)
Dept: HEALTH INFORMATION MANAGEMENT | Facility: CLINIC | Age: 88
End: 2023-02-24

## 2023-05-03 ENCOUNTER — TELEPHONE (OUTPATIENT)
Dept: RADIATION ONCOLOGY | Facility: CLINIC | Age: 88
End: 2023-05-03
Payer: MEDICARE

## 2023-05-03 NOTE — TELEPHONE ENCOUNTER
I spoke with Penelope and asked if he was ready to schedule a return visit with Dr. Cota and he stated he had an MRI & PET scan at the end up of April 2023 and his Oncologist advised him that he's in total remission and patient states he's feeling really good and doesn't want to schedule with Dr. Cota at this time and asked me to call him mid July 2023 to see if things have changed. He's having another MRI & PET scan done the end of June. I will reach out to him in mid July.

## 2023-08-14 ENCOUNTER — TELEPHONE (OUTPATIENT)
Dept: RADIATION ONCOLOGY | Facility: CLINIC | Age: 88
End: 2023-08-14
Payer: MEDICARE

## 2023-08-14 NOTE — TELEPHONE ENCOUNTER
I spoke with Penelope in May of 2023 and asked if he was ready to schedule a return visit with Dr. Cota and he stated he had an MRI & PET scan at the end up of April 2023 and his Oncologist advised him that he's in total remission and patient states he's feeling really good and doesn't want to schedule with Dr. Cota at this time and asked me to call him mid July 2023 to see if things have changed. He's having another MRI & PET scan done the end of June.I tried reaching out to Penelope in July via telephone and left him a voicemail, but he hasn't returned my call. Today I sent him a "[employer] message and just asked him to contact me when he's ready to schedule with Dr. Cota.

## 2024-03-10 ENCOUNTER — HEALTH MAINTENANCE LETTER (OUTPATIENT)
Age: 89
End: 2024-03-10

## 2024-07-19 ENCOUNTER — TRANSFERRED RECORDS (OUTPATIENT)
Dept: HEALTH INFORMATION MANAGEMENT | Facility: CLINIC | Age: 89
End: 2024-07-19
Payer: MEDICARE

## 2024-10-11 ENCOUNTER — TRANSFERRED RECORDS (OUTPATIENT)
Dept: HEALTH INFORMATION MANAGEMENT | Facility: CLINIC | Age: 89
End: 2024-10-11
Payer: MEDICARE

## 2025-03-15 ENCOUNTER — TRANSFERRED RECORDS (OUTPATIENT)
Dept: HEALTH INFORMATION MANAGEMENT | Facility: CLINIC | Age: OVER 89
End: 2025-03-15

## 2025-03-16 ENCOUNTER — HEALTH MAINTENANCE LETTER (OUTPATIENT)
Age: OVER 89
End: 2025-03-16

## 2025-03-17 ENCOUNTER — TRANSFERRED RECORDS (OUTPATIENT)
Dept: HEALTH INFORMATION MANAGEMENT | Facility: CLINIC | Age: OVER 89
End: 2025-03-17

## 2025-03-28 ENCOUNTER — TRANSFERRED RECORDS (OUTPATIENT)
Dept: HEALTH INFORMATION MANAGEMENT | Facility: CLINIC | Age: OVER 89
End: 2025-03-28

## 2025-03-28 LAB — EJECTION FRACTION: NORMAL %

## 2025-04-14 ENCOUNTER — TRANSFERRED RECORDS (OUTPATIENT)
Dept: HEALTH INFORMATION MANAGEMENT | Facility: CLINIC | Age: OVER 89
End: 2025-04-14
Payer: MEDICARE

## 2025-05-09 ENCOUNTER — APPOINTMENT (OUTPATIENT)
Dept: ULTRASOUND IMAGING | Facility: CLINIC | Age: OVER 89
DRG: 193 | End: 2025-05-09
Attending: EMERGENCY MEDICINE
Payer: MEDICARE

## 2025-05-09 ENCOUNTER — APPOINTMENT (OUTPATIENT)
Dept: GENERAL RADIOLOGY | Facility: CLINIC | Age: OVER 89
DRG: 193 | End: 2025-05-09
Attending: EMERGENCY MEDICINE
Payer: MEDICARE

## 2025-05-09 ENCOUNTER — HOSPITAL ENCOUNTER (INPATIENT)
Facility: CLINIC | Age: OVER 89
LOS: 5 days | Discharge: HOME | DRG: 193 | End: 2025-05-14
Attending: EMERGENCY MEDICINE | Admitting: HOSPITALIST
Payer: MEDICARE

## 2025-05-09 ENCOUNTER — APPOINTMENT (OUTPATIENT)
Dept: CT IMAGING | Facility: CLINIC | Age: OVER 89
DRG: 193 | End: 2025-05-09
Attending: EMERGENCY MEDICINE
Payer: MEDICARE

## 2025-05-09 DIAGNOSIS — K86.89 PANCREATIC MASS: ICD-10-CM

## 2025-05-09 DIAGNOSIS — R07.9 RIGHT-SIDED CHEST PAIN: ICD-10-CM

## 2025-05-09 DIAGNOSIS — J18.9 PNEUMONIA OF RIGHT LUNG DUE TO INFECTIOUS ORGANISM, UNSPECIFIED PART OF LUNG: ICD-10-CM

## 2025-05-09 DIAGNOSIS — R10.9 RIGHT SIDED ABDOMINAL PAIN: ICD-10-CM

## 2025-05-09 DIAGNOSIS — I25.10 CORONARY ARTERY DISEASE INVOLVING NATIVE CORONARY ARTERY OF NATIVE HEART WITHOUT ANGINA PECTORIS: Primary | ICD-10-CM

## 2025-05-09 LAB
ALBUMIN SERPL BCG-MCNC: 3.3 G/DL (ref 3.5–5.2)
ALP SERPL-CCNC: 91 U/L (ref 40–150)
ALT SERPL W P-5'-P-CCNC: 18 U/L (ref 0–70)
ANION GAP SERPL CALCULATED.3IONS-SCNC: 11 MMOL/L (ref 7–15)
AST SERPL W P-5'-P-CCNC: 17 U/L (ref 0–45)
ATRIAL RATE - MUSE: 91 BPM
BASOPHILS # BLD AUTO: 0 10E3/UL (ref 0–0.2)
BASOPHILS NFR BLD AUTO: 0 %
BILIRUB SERPL-MCNC: 0.2 MG/DL
BUN SERPL-MCNC: 45.2 MG/DL (ref 8–23)
CALCIUM SERPL-MCNC: 8.9 MG/DL (ref 8.8–10.4)
CHLORIDE SERPL-SCNC: 109 MMOL/L (ref 98–107)
CREAT SERPL-MCNC: 1.64 MG/DL (ref 0.67–1.17)
DIASTOLIC BLOOD PRESSURE - MUSE: NORMAL MMHG
EGFRCR SERPLBLD CKD-EPI 2021: 40 ML/MIN/1.73M2
EOSINOPHIL # BLD AUTO: 0.1 10E3/UL (ref 0–0.7)
EOSINOPHIL NFR BLD AUTO: 1 %
ERYTHROCYTE [DISTWIDTH] IN BLOOD BY AUTOMATED COUNT: 12.9 % (ref 10–15)
GLUCOSE SERPL-MCNC: 120 MG/DL (ref 70–99)
HCO3 SERPL-SCNC: 22 MMOL/L (ref 22–29)
HCT VFR BLD AUTO: 33.4 % (ref 40–53)
HGB BLD-MCNC: 10.9 G/DL (ref 13.3–17.7)
HOLD SPECIMEN: NORMAL
HOLD SPECIMEN: NORMAL
IMM GRANULOCYTES # BLD: 0 10E3/UL
IMM GRANULOCYTES NFR BLD: 0 %
INTERPRETATION ECG - MUSE: NORMAL
L PNEUMO1 AG UR QL IA: NEGATIVE
LACTATE SERPL-SCNC: 0.9 MMOL/L (ref 0.7–2)
LIPASE SERPL-CCNC: 35 U/L (ref 13–60)
LYMPHOCYTES # BLD AUTO: 0.2 10E3/UL (ref 0.8–5.3)
LYMPHOCYTES NFR BLD AUTO: 4 %
MCH RBC QN AUTO: 32.2 PG (ref 26.5–33)
MCHC RBC AUTO-ENTMCNC: 32.6 G/DL (ref 31.5–36.5)
MCV RBC AUTO: 99 FL (ref 78–100)
MONOCYTES # BLD AUTO: 0.4 10E3/UL (ref 0–1.3)
MONOCYTES NFR BLD AUTO: 7 %
NEUTROPHILS # BLD AUTO: 5.4 10E3/UL (ref 1.6–8.3)
NEUTROPHILS NFR BLD AUTO: 88 %
NRBC # BLD AUTO: 0 10E3/UL
NRBC BLD AUTO-RTO: 0 /100
P AXIS - MUSE: 37 DEGREES
PLATELET # BLD AUTO: 256 10E3/UL (ref 150–450)
POTASSIUM SERPL-SCNC: 4 MMOL/L (ref 3.4–5.3)
PR INTERVAL - MUSE: 132 MS
PROT SERPL-MCNC: 6.7 G/DL (ref 6.4–8.3)
QRS DURATION - MUSE: 82 MS
QT - MUSE: 370 MS
QTC - MUSE: 455 MS
R AXIS - MUSE: 24 DEGREES
RBC # BLD AUTO: 3.38 10E6/UL (ref 4.4–5.9)
S PNEUM AG SPEC QL: NEGATIVE
SODIUM SERPL-SCNC: 142 MMOL/L (ref 135–145)
SPECIMEN TYPE: NORMAL
SYSTOLIC BLOOD PRESSURE - MUSE: NORMAL MMHG
T AXIS - MUSE: 46 DEGREES
TROPONIN T SERPL HS-MCNC: 46 NG/L
TROPONIN T SERPL HS-MCNC: 52 NG/L
VENTRICULAR RATE- MUSE: 91 BPM
WBC # BLD AUTO: 6.2 10E3/UL (ref 4–11)

## 2025-05-09 PROCEDURE — 36415 COLL VENOUS BLD VENIPUNCTURE: CPT | Performed by: EMERGENCY MEDICINE

## 2025-05-09 PROCEDURE — 76705 ECHO EXAM OF ABDOMEN: CPT

## 2025-05-09 PROCEDURE — 87040 BLOOD CULTURE FOR BACTERIA: CPT | Performed by: HOSPITALIST

## 2025-05-09 PROCEDURE — 250N000013 HC RX MED GY IP 250 OP 250 PS 637: Performed by: HOSPITALIST

## 2025-05-09 PROCEDURE — 93005 ELECTROCARDIOGRAM TRACING: CPT

## 2025-05-09 PROCEDURE — 83605 ASSAY OF LACTIC ACID: CPT | Performed by: EMERGENCY MEDICINE

## 2025-05-09 PROCEDURE — 250N000011 HC RX IP 250 OP 636: Performed by: EMERGENCY MEDICINE

## 2025-05-09 PROCEDURE — 84145 PROCALCITONIN (PCT): CPT | Performed by: INTERNAL MEDICINE

## 2025-05-09 PROCEDURE — 82310 ASSAY OF CALCIUM: CPT | Performed by: EMERGENCY MEDICINE

## 2025-05-09 PROCEDURE — 85025 COMPLETE CBC W/AUTO DIFF WBC: CPT | Performed by: EMERGENCY MEDICINE

## 2025-05-09 PROCEDURE — 250N000009 HC RX 250: Performed by: EMERGENCY MEDICINE

## 2025-05-09 PROCEDURE — 96376 TX/PRO/DX INJ SAME DRUG ADON: CPT

## 2025-05-09 PROCEDURE — 99223 1ST HOSP IP/OBS HIGH 75: CPT | Mod: AI | Performed by: HOSPITALIST

## 2025-05-09 PROCEDURE — 250N000011 HC RX IP 250 OP 636: Performed by: HOSPITALIST

## 2025-05-09 PROCEDURE — 83690 ASSAY OF LIPASE: CPT | Performed by: EMERGENCY MEDICINE

## 2025-05-09 PROCEDURE — 71046 X-RAY EXAM CHEST 2 VIEWS: CPT

## 2025-05-09 PROCEDURE — 99285 EMERGENCY DEPT VISIT HI MDM: CPT | Mod: 25

## 2025-05-09 PROCEDURE — 74177 CT ABD & PELVIS W/CONTRAST: CPT

## 2025-05-09 PROCEDURE — 96374 THER/PROPH/DIAG INJ IV PUSH: CPT | Mod: 59

## 2025-05-09 PROCEDURE — 87899 AGENT NOS ASSAY W/OPTIC: CPT | Performed by: HOSPITALIST

## 2025-05-09 PROCEDURE — 84484 ASSAY OF TROPONIN QUANT: CPT | Performed by: EMERGENCY MEDICINE

## 2025-05-09 PROCEDURE — 120N000001 HC R&B MED SURG/OB

## 2025-05-09 PROCEDURE — 250N000011 HC RX IP 250 OP 636: Mod: JZ | Performed by: EMERGENCY MEDICINE

## 2025-05-09 RX ORDER — IPRATROPIUM BROMIDE AND ALBUTEROL SULFATE 2.5; .5 MG/3ML; MG/3ML
1 SOLUTION RESPIRATORY (INHALATION) EVERY 6 HOURS PRN
COMMUNITY

## 2025-05-09 RX ORDER — CEFTRIAXONE 1 G/1
1 INJECTION, POWDER, FOR SOLUTION INTRAMUSCULAR; INTRAVENOUS ONCE
Status: COMPLETED | OUTPATIENT
Start: 2025-05-09 | End: 2025-05-09

## 2025-05-09 RX ORDER — CEFTRIAXONE 2 G/1
2 INJECTION, POWDER, FOR SOLUTION INTRAMUSCULAR; INTRAVENOUS EVERY 24 HOURS
Status: DISCONTINUED | OUTPATIENT
Start: 2025-05-10 | End: 2025-05-14 | Stop reason: HOSPADM

## 2025-05-09 RX ORDER — NALOXONE HYDROCHLORIDE 0.4 MG/ML
0.2 INJECTION, SOLUTION INTRAMUSCULAR; INTRAVENOUS; SUBCUTANEOUS
Status: DISCONTINUED | OUTPATIENT
Start: 2025-05-09 | End: 2025-05-14 | Stop reason: HOSPADM

## 2025-05-09 RX ORDER — IPRATROPIUM BROMIDE AND ALBUTEROL SULFATE 2.5; .5 MG/3ML; MG/3ML
3 SOLUTION RESPIRATORY (INHALATION) EVERY 4 HOURS PRN
Status: DISCONTINUED | OUTPATIENT
Start: 2025-05-09 | End: 2025-05-14 | Stop reason: HOSPADM

## 2025-05-09 RX ORDER — MORPHINE SULFATE 4 MG/ML
4 INJECTION, SOLUTION INTRAMUSCULAR; INTRAVENOUS ONCE
Refills: 0 | Status: COMPLETED | OUTPATIENT
Start: 2025-05-09 | End: 2025-05-09

## 2025-05-09 RX ORDER — OXYCODONE HYDROCHLORIDE 5 MG/1
5 TABLET ORAL EVERY 4 HOURS PRN
Status: DISCONTINUED | OUTPATIENT
Start: 2025-05-09 | End: 2025-05-14 | Stop reason: HOSPADM

## 2025-05-09 RX ORDER — LIDOCAINE 4 G/G
1 PATCH TOPICAL
Status: DISCONTINUED | OUTPATIENT
Start: 2025-05-09 | End: 2025-05-14 | Stop reason: HOSPADM

## 2025-05-09 RX ORDER — LIDOCAINE 40 MG/G
CREAM TOPICAL
Status: DISCONTINUED | OUTPATIENT
Start: 2025-05-09 | End: 2025-05-14 | Stop reason: HOSPADM

## 2025-05-09 RX ORDER — HYDRALAZINE HYDROCHLORIDE 20 MG/ML
10 INJECTION INTRAMUSCULAR; INTRAVENOUS EVERY 4 HOURS PRN
Status: DISCONTINUED | OUTPATIENT
Start: 2025-05-09 | End: 2025-05-14 | Stop reason: HOSPADM

## 2025-05-09 RX ORDER — ACETAMINOPHEN 325 MG/1
650 TABLET ORAL EVERY 4 HOURS PRN
Status: DISCONTINUED | OUTPATIENT
Start: 2025-05-09 | End: 2025-05-14 | Stop reason: HOSPADM

## 2025-05-09 RX ORDER — IOPAMIDOL 755 MG/ML
78 INJECTION, SOLUTION INTRAVASCULAR ONCE
Status: COMPLETED | OUTPATIENT
Start: 2025-05-09 | End: 2025-05-09

## 2025-05-09 RX ORDER — METHOCARBAMOL 500 MG/1
500 TABLET, FILM COATED ORAL 4 TIMES DAILY
Status: DISCONTINUED | OUTPATIENT
Start: 2025-05-09 | End: 2025-05-11

## 2025-05-09 RX ORDER — AZITHROMYCIN MONOHYDRATE 500 MG/5ML
500 INJECTION, POWDER, LYOPHILIZED, FOR SOLUTION INTRAVENOUS ONCE
Status: COMPLETED | OUTPATIENT
Start: 2025-05-09 | End: 2025-05-09

## 2025-05-09 RX ORDER — AMOXICILLIN 250 MG
1 CAPSULE ORAL 2 TIMES DAILY PRN
Status: DISCONTINUED | OUTPATIENT
Start: 2025-05-09 | End: 2025-05-14 | Stop reason: HOSPADM

## 2025-05-09 RX ORDER — ONDANSETRON 2 MG/ML
4 INJECTION INTRAMUSCULAR; INTRAVENOUS EVERY 6 HOURS PRN
Status: DISCONTINUED | OUTPATIENT
Start: 2025-05-09 | End: 2025-05-14 | Stop reason: HOSPADM

## 2025-05-09 RX ORDER — CARVEDILOL 3.12 MG/1
3.12 TABLET ORAL 2 TIMES DAILY WITH MEALS
COMMUNITY

## 2025-05-09 RX ORDER — HEPARIN SODIUM 5000 [USP'U]/.5ML
5000 INJECTION, SOLUTION INTRAVENOUS; SUBCUTANEOUS EVERY 8 HOURS
Status: DISCONTINUED | OUTPATIENT
Start: 2025-05-09 | End: 2025-05-10

## 2025-05-09 RX ORDER — CALCIUM CARBONATE 500 MG/1
1000 TABLET, CHEWABLE ORAL 4 TIMES DAILY PRN
Status: DISCONTINUED | OUTPATIENT
Start: 2025-05-09 | End: 2025-05-14 | Stop reason: HOSPADM

## 2025-05-09 RX ORDER — HYDROMORPHONE HCL IN WATER/PF 6 MG/30 ML
0.4 PATIENT CONTROLLED ANALGESIA SYRINGE INTRAVENOUS
Status: DISCONTINUED | OUTPATIENT
Start: 2025-05-09 | End: 2025-05-14 | Stop reason: HOSPADM

## 2025-05-09 RX ORDER — NALOXONE HYDROCHLORIDE 0.4 MG/ML
0.4 INJECTION, SOLUTION INTRAMUSCULAR; INTRAVENOUS; SUBCUTANEOUS
Status: DISCONTINUED | OUTPATIENT
Start: 2025-05-09 | End: 2025-05-14 | Stop reason: HOSPADM

## 2025-05-09 RX ORDER — PREDNISONE 20 MG/1
20 TABLET ORAL 2 TIMES DAILY PRN
COMMUNITY

## 2025-05-09 RX ORDER — CARVEDILOL 3.12 MG/1
3.12 TABLET ORAL 2 TIMES DAILY WITH MEALS
Status: DISCONTINUED | OUTPATIENT
Start: 2025-05-09 | End: 2025-05-14 | Stop reason: HOSPADM

## 2025-05-09 RX ORDER — AZITHROMYCIN MONOHYDRATE 500 MG/5ML
500 INJECTION, POWDER, LYOPHILIZED, FOR SOLUTION INTRAVENOUS EVERY 24 HOURS
Status: DISCONTINUED | OUTPATIENT
Start: 2025-05-09 | End: 2025-05-09

## 2025-05-09 RX ORDER — CIPROFLOXACIN 500 MG/1
500 TABLET, FILM COATED ORAL DAILY PRN
COMMUNITY

## 2025-05-09 RX ORDER — ONDANSETRON 4 MG/1
4 TABLET, ORALLY DISINTEGRATING ORAL EVERY 6 HOURS PRN
Status: DISCONTINUED | OUTPATIENT
Start: 2025-05-09 | End: 2025-05-14 | Stop reason: HOSPADM

## 2025-05-09 RX ORDER — AMOXICILLIN 250 MG
2 CAPSULE ORAL 2 TIMES DAILY PRN
Status: DISCONTINUED | OUTPATIENT
Start: 2025-05-09 | End: 2025-05-14 | Stop reason: HOSPADM

## 2025-05-09 RX ORDER — AMLODIPINE BESYLATE 5 MG/1
5 TABLET ORAL DAILY
Status: DISCONTINUED | OUTPATIENT
Start: 2025-05-10 | End: 2025-05-14 | Stop reason: HOSPADM

## 2025-05-09 RX ORDER — HYDROMORPHONE HCL IN WATER/PF 6 MG/30 ML
0.2 PATIENT CONTROLLED ANALGESIA SYRINGE INTRAVENOUS
Status: DISCONTINUED | OUTPATIENT
Start: 2025-05-09 | End: 2025-05-14 | Stop reason: HOSPADM

## 2025-05-09 RX ORDER — GUAIFENESIN/DEXTROMETHORPHAN 100-10MG/5
10 SYRUP ORAL EVERY 4 HOURS PRN
Status: DISCONTINUED | OUTPATIENT
Start: 2025-05-09 | End: 2025-05-14 | Stop reason: HOSPADM

## 2025-05-09 RX ORDER — ASCORBIC ACID 500 MG
500 TABLET ORAL DAILY
COMMUNITY

## 2025-05-09 RX ORDER — ACETAMINOPHEN 650 MG/1
650 SUPPOSITORY RECTAL EVERY 4 HOURS PRN
Status: DISCONTINUED | OUTPATIENT
Start: 2025-05-09 | End: 2025-05-14 | Stop reason: HOSPADM

## 2025-05-09 RX ADMIN — METHOCARBAMOL 500 MG: 500 TABLET ORAL at 17:57

## 2025-05-09 RX ADMIN — HEPARIN SODIUM 5000 UNITS: 5000 INJECTION, SOLUTION INTRAVENOUS; SUBCUTANEOUS at 21:05

## 2025-05-09 RX ADMIN — LIDOCAINE 1 PATCH: 4 PATCH TOPICAL at 21:02

## 2025-05-09 RX ADMIN — CARVEDILOL 3.12 MG: 3.12 TABLET, FILM COATED ORAL at 21:01

## 2025-05-09 RX ADMIN — ACETAMINOPHEN 650 MG: 325 TABLET, FILM COATED ORAL at 21:04

## 2025-05-09 RX ADMIN — OXYCODONE HYDROCHLORIDE 5 MG: 5 TABLET ORAL at 18:52

## 2025-05-09 RX ADMIN — CEFTRIAXONE SODIUM 1 G: 1 INJECTION, POWDER, FOR SOLUTION INTRAMUSCULAR; INTRAVENOUS at 16:30

## 2025-05-09 RX ADMIN — METHOCARBAMOL 500 MG: 500 TABLET ORAL at 21:04

## 2025-05-09 RX ADMIN — SODIUM CHLORIDE 62 ML: 9 INJECTION, SOLUTION INTRAVENOUS at 16:24

## 2025-05-09 RX ADMIN — MORPHINE SULFATE 2 MG: 4 INJECTION, SOLUTION INTRAMUSCULAR; INTRAVENOUS at 13:41

## 2025-05-09 RX ADMIN — AZITHROMYCIN MONOHYDRATE 500 MG: 500 INJECTION, POWDER, LYOPHILIZED, FOR SOLUTION INTRAVENOUS at 17:57

## 2025-05-09 RX ADMIN — IOPAMIDOL 78 ML: 755 INJECTION, SOLUTION INTRAVENOUS at 16:24

## 2025-05-09 RX ADMIN — MORPHINE SULFATE 2 MG: 4 INJECTION, SOLUTION INTRAMUSCULAR; INTRAVENOUS at 12:51

## 2025-05-09 ASSESSMENT — ACTIVITIES OF DAILY LIVING (ADL)
ADLS_ACUITY_SCORE: 42
ADLS_ACUITY_SCORE: 41
ADLS_ACUITY_SCORE: 42
ADLS_ACUITY_SCORE: 41
ADLS_ACUITY_SCORE: 41
ADLS_ACUITY_SCORE: 42

## 2025-05-09 NOTE — ED TRIAGE NOTES
Pt sts he has had increasing SOB for weeks, also has CP. UNABLE TO GET GOOD SAT IN TRIAGE AND PT sts that is normal     Triage Assessment (Adult)       Row Name 05/09/25 1045          Triage Assessment    Airway WDL WDL        Respiratory WDL    Respiratory WDL rhythm/pattern     Rhythm/Pattern, Respiratory shortness of breath        Skin Circulation/Temperature WDL    Skin Circulation/Temperature WDL WDL        Cardiac WDL    Cardiac WDL WDL        Peripheral/Neurovascular WDL    Peripheral Neurovascular WDL WDL        Cognitive/Neuro/Behavioral WDL    Cognitive/Neuro/Behavioral WDL WDL

## 2025-05-09 NOTE — PROGRESS NOTES
RECEIVING UNIT ED HANDOFF REVIEW    ED Nurse Handoff Report was reviewed by: Myra Crowe RN on May 9, 2025 at 5:41 PM

## 2025-05-09 NOTE — H&P
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    He has been having some shortness of breath for several weeks now. In February he was treated for pneumonia with a course of Ciprofloxacin and doxycycline. He then was admitted mid March with pneumonia (left basilar infiltrate) and was treated with a course of levofloxacin/Cefepime while hospitalized and then completed course of oral levofloxacin. During that hospitalization he was also noted with some diastolic CHF and some valvular abnormalities. He reports he was feeling better but then past couple of days started having worsening shortness of breath again with sharp pleuritic chest pain worse with any movement or deep inspiration. Reports subjective fever and chills. He was supposed to go to a cardiology clinic for follow-up but presented to ED with above symptoms.    Right-sided community acquired pneumonia  right-sided chest pain, likely pleuritic/musculoskeletal secondary to above  acute hypoxic respiratory failure secondary to above  likely type II non-STEMI secondary to above  chronic right pleural effusion  COPD, not an exacerbation  -recent hospitalization for left sided pneumonia and initial presentation as noted above  -now presents with acute right chest pain with cough and shortness of breath, likely pleuritic  -afebrile here, normal WBC; troponin elevated without significant trend 52--- 46  -EKG normal sinus  "rhythm  -chest x-ray noted new opacities in right jonas-hilar region and right lower lobe suggested for pneumonia; small right pleural effusion  -ultrasound abdomen did nothing acute  -LFTs unremarkable  -CT abdomen has been ordered from ED and pending    -Will admit as inpatient  -started on Rocephin and azithromycin; will continue  -will check urine Legionella and streptococcal antigen  -encourage incentive spirometry and wean oxygen as able  -right-sided chest pain upper abdominal pain is likely pleuritic from pneumonia; low clinical concern for ACS  -will schedule Robaxin four times daily along with a lidocaine patch; will also have Tylenol, oxycodone and IV Dilaudid PRN  -PRN Robitussin-DM for cough  -right sided pleural effusion seems to be chronic; however if no significant clinical improvement with antibiotics will consider doing a thoracentesis  -duo nebs PRN    Small cell lung cancer (9/2022)  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 Desai from [facility]  - is s/p chemo and radiation therapy and per family he has been in remission; was last seen in oncology clinic in January  - CT abdomen 5/9 note \"1.7 x 1.7 cm cystic focus in the pancreatic head, possibly in intraductal papillary mucinous neoplasm, recommend continued attention to detail and surveillance imaging\"  - will consult [facility]    Hypertension  moderate aortic regurgitation  mild MR and TR  diastolic CHF  -he was diuresed briefly during hospitalization in March  -ECHO March 2025 noted EF 45 to 50%, grade 1 diastolic dysfunction  -currently appears euvolumic  -will monitor volume status closely  -is planned to follow up with cardiology as outpatient  -will resume PTA amlodipine, Coreg when verified by pharmacy    CKD stage III  chronic anemia  -creatinine 1.64; stable around baseline of 1.3 to 1.6  -hemoglobin 10.9  -monitor CBC and BMP intermittently    H/o abdominal aortic aneurysm  -I see a vascular surgery note from October " "2023 which mentions \"presence of an infrarenal abdominal aortic aneurysm measuring 4.6 x 4.4 centimeters \"  -not sure if he has been followed up with vascular surgery  - CT abdomen 5/9/25 noted \"4.9 x 4.7 cm infrarenal abdominal aortic aneurysm, unchanged.\"  -would recommend follow-up with vascular surgery as outpatient    Clinically Significant Risk Factors Present on Admission          # Hyperchloremia: Highest Cl = 109 mmol/L in last 2 days, will monitor as appropriate          # Hypoalbuminemia: Lowest albumin = 3.3 g/dL at 5/9/2025 11:46 AM, will monitor as appropriate            # Anemia: based on hgb <11                         DVT prophylaxis: subcutaneous heparin  Code status: DNR/DNI    Disposition:   Medically Ready for Discharge: Anticipated in 2-4 Days       Care plan discussed with ED provider, patient and her daughter present in room along with nursing           Primary Care Physician:   Jens Helms [phone]         Chief Complaint:   right-sided chest pain, cough, shortness of breath    History is obtained from the patient         History of Present Illness:     Penelope Cisneros is a 89 year old male with PMH significant for hypertension, COPD (not on home oxygen), CKD stage III, h/o AAA, moderate aortic regurgitation, mild MR/TR, diastolic CHF, h/o 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    He has been having some shortness of breath for several weeks now. In February he was treated for pneumonia with a course of Ciprofloxacin and doxycycline. He then was admitted mid March with pneumonia (left basilar infiltrate) and was treated with a course of levofloxacin/Cefepime while hospitalized and then completed course of oral levofloxacin. During that hospitalization he was also noted with some diastolic CHF and some valvular abnormalities. He reports he was feeling better " but then past couple of days started having worsening shortness of breath again with sharp pleuritic chest pain worse with any movement or deep inspiration. Reports subjective fever and chills. He was supposed to go to a cardiology clinic for follow-up but presented to ED with above symptoms.    In the ED he was seen by Dr. Coppola.  -presents with acute right chest pain with cough and shortness of breath, likely pleuritic  -afebrile here, normal WBC; troponin elevated without significant trend 52--- 46  -EKG normal sinus rhythm  -chest x-ray noted new opacities in right jonas-hilar region and right lower lobe suggested for pneumonia; small right pleural effusion  -ultrasound abdomen did nothing acute  -LFTs unremarkable  -CT abdomen has been ordered from ED and pending  -started on Rocephin and azithromycin and hospitalist was requested admission for further evaluation.    Denies pain abdomen.  No bowel or bladder disturbances.           Past Medical History:     Hypertension  COPD (not on home oxygen)  CKD stage III  h/o AAA  moderate aortic regurgitation  mild MR/TR  diastolic CHF  h/o small cell lung cancer (2022, s/p chemo and radiation)           Past Surgical History:     Past Surgical History:   Procedure Laterality Date    APPENDECTOMY      ARTHROSCOPY SHOULDER Left     IR LUNG BIOPSY MEDIASTINUM RIGHT  10/20/2022     REPAIR CLEFT PALATE  1937    ROTATOR CUFF REPAIR RT/LT Right     TONSILLECTOMY                Home Medications:     Prior to Admission Medications   Prescriptions Last Dose Informant Patient Reported? Taking?   Multiple Vitamin (MULTIVITAMIN PO)   Yes No   Multiple Vitamins-Minerals (OCUVITE PRESERVISION PO)   Yes No   amLODIPine (NORVASC) 5 MG tablet   Yes No   Sig: Take 5 mg by mouth daily      Facility-Administered Medications: None            Allergies:   No Known Allergies         Social History:   Penelope Cisneros  reports that he has been smoking cigarettes. He has a 65 pack-year  smoking history. He has never used smokeless tobacco. He reports current alcohol use of about 14.0 standard drinks of alcohol per week. He reports that he does not use drugs.              Family History:   Penelope Cisneros family history includes Cancer in his father; Prostate Cancer in his brother.    Family history was reviewed by myself and not pertinent to current presentation.           Review of Systems:   A10 point Review of Systems was done and were negative other than noted in the HPI.             Physical Exam:   Blood pressure (!) 149/71, pulse 87, temperature 97.9  F (36.6  C), temperature source Oral, resp. rate 23, SpO2 93%.  0 lbs 0 oz        Constitutional: Alert, awake and oriented X 3; frail appearing ; lying in bed with mild intermittent distress with sharp right-sided chest pain with movement or deep inspiration    HEENT: Pupils equal and reactive to light and accomodation, EOMI intact; neck supple no raised JVD or rigidity    Oral cavity: Moist mucosa   Cardiovascular: Normal s1 s2, regular rate and rhythm, no murmur   Lungs: right basal crepts; no significant wheezes    Abdomen: Soft, nt, nd, no guarding, rigidity or rebound; BS +   LE : No edema   Musculoskeletal: Power 5/5 in all extremities   Neuro: No focal neurological deficits noted, CN II to XII grossly intact   Psychiatry: normal mood and affect  Skin: No obvious skin rashes or ulcers             Data:   All new lab and imaging data was reviewed in Epic.   Significant labs and imagings include:    CMP notable for BUN 45, creatinine 1.64, normal LFTs  troponin T 52--- 46  CBC with WBC 6.2, hemoglobin 10.9  blood cultures pending  EKG normal sinus rhythm  chest x-ray:  IMPRESSION: Borderline heart size. Allowing for differences in technique, new opacities in the right perihilar region and right lower lung suspicious for pneumonia. Small right pleural effusion. No significant left effusion. Calcified pleural plaques. No  pneumothorax.  Ultrasound:  IMPRESSION:  1.  Right pleural effusion.  2.  Otherwise negative right upper quadrant ultrasound.  CT abdomen pending             Imtiaz Brown MD  Hospitalist

## 2025-05-09 NOTE — ED PROVIDER NOTES
Emergency Department Note      History of Present Illness     Chief Complaint   Shortness of Breath      HPI   Penelope Cisneros is a 89 year old male presenting with his son and daughter with worsening shortness of breath and pain.  He reports he lives in [place] and his son picked him up to come up to the [facility] for a cardiology appointment this afternoon.  He states his shortness of breath has been worsening over the past few weeks.  He states he has right-sided abdomen and right-sided chest pain that is worse with deep inhalation and movement.  No fever, chills, nausea, vomiting.  No recent falls or injuries.    Independent Historian   Son and Daughter as detailed above.    Review of External Notes   24: ED note reviewed    Past Medical History     Medical History and Problem List   Past Medical History:   Diagnosis Date    Gout     Personal history of chemotherapy     S/P radiation therapy     Small cell lung cancer (H) 10/20/2022       Medications   amLODIPine (NORVASC) 5 MG tablet  Multiple Vitamin (MULTIVITAMIN PO)  Multiple Vitamins-Minerals (OCUVITE PRESERVISION PO)        Surgical History   Past Surgical History:   Procedure Laterality Date    APPENDECTOMY      ARTHROSCOPY SHOULDER Left     IR LUNG BIOPSY MEDIASTINUM RIGHT  10/20/2022     REPAIR CLEFT PALATE  193    ROTATOR CUFF REPAIR RT/LT Right     TONSILLECTOMY         Physical Exam     Patient Vitals for the past 24 hrs:   BP Temp Temp src Pulse Resp SpO2   25 1619 -- -- -- 87 23 93 %   25 1614 -- -- -- 89 26 93 %   25 1604 -- -- -- 88 24 93 %   25 1559 -- -- -- 87 24 94 %   25 1549 -- -- -- 89 27 94 %   25 1544 -- -- -- 90 30 92 %   25 1529 -- -- -- 87 25 93 %   25 1252 -- -- -- -- -- 95 %   25 1037 (!) 149/71 97.9  F (36.6  C) Oral 64 30 --     Physical Exam  General: No acute distress  Head: No obvious trauma to head.  Ears, Nose, Throat:  External ears normal.  Nose normal.   No pharyngeal erythema, swelling or exudate.  Midline uvula. Moist mucus membranes.  Eyes:  Conjunctivae clear.   Neck: Normal range of motion.  Neck supple.   CV: Regular rate and rhythm.  No murmurs.      Respiratory: Effort normal and breath sounds normal.  No wheezing or crackles.   Gastrointestinal: Soft.  No distension. Diffuse right sided abdominal pain to palpation.  There is no rigidity, no rebound and no guarding.   Musculoskeletal: Normal range of motion.  Non tender extremities to palpations. No lower extremity edema  Neuro: Alert. Moving all extremities appropriately.  Normal speech.    Skin: Skin is warm and dry.  No rash noted.   Psych: Normal mood and affect. Behavior is normal.       Diagnostics     Lab Results   Labs Ordered and Resulted from Time of ED Arrival to Time of ED Departure   COMPREHENSIVE METABOLIC PANEL - Abnormal       Result Value    Sodium 142      Potassium 4.0      Carbon Dioxide (CO2) 22      Anion Gap 11      Urea Nitrogen 45.2 (*)     Creatinine 1.64 (*)     GFR Estimate 40 (*)     Calcium 8.9      Chloride 109 (*)     Glucose 120 (*)     Alkaline Phosphatase 91      AST 17      ALT 18      Protein Total 6.7      Albumin 3.3 (*)     Bilirubin Total 0.2     CBC WITH PLATELETS AND DIFFERENTIAL - Abnormal    WBC Count 6.2      RBC Count 3.38 (*)     Hemoglobin 10.9 (*)     Hematocrit 33.4 (*)     MCV 99      MCH 32.2      MCHC 32.6      RDW 12.9      Platelet Count 256      % Neutrophils 88      % Lymphocytes 4      % Monocytes 7      % Eosinophils 1      % Basophils 0      % Immature Granulocytes 0      NRBCs per 100 WBC 0      Absolute Neutrophils 5.4      Absolute Lymphocytes 0.2 (*)     Absolute Monocytes 0.4      Absolute Eosinophils 0.1      Absolute Basophils 0.0      Absolute Immature Granulocytes 0.0      Absolute NRBCs 0.0     TROPONIN T, HIGH SENSITIVITY - Abnormal    Troponin T, High Sensitivity 52 (*)    TROPONIN T, HIGH SENSITIVITY - Abnormal    Troponin T, High  Sensitivity 46 (*)    LACTIC ACID WHOLE BLOOD WITH 1X REPEAT IN 2 HR WHEN >2 - Normal    Lactic Acid, Initial 0.9     LIPASE - Normal    Lipase 35     BLOOD CULTURE   BLOOD CULTURE       Imaging   US Abdomen Limited   Final Result   IMPRESSION:   1.  Right pleural effusion.   2.  Otherwise negative right upper quadrant ultrasound.            Chest XR,  PA & LAT   Final Result   IMPRESSION: Borderline heart size. Allowing for differences in technique, new opacities in the right perihilar region and right lower lung suspicious for pneumonia. Small right pleural effusion. No significant left effusion. Calcified pleural plaques. No    pneumothorax.      CT Abdomen Pelvis w Contrast    (Results Pending)       EKG   ECG results from 05/09/25   EKG 12-lead, tracing only     Value    Systolic Blood Pressure     Diastolic Blood Pressure     Ventricular Rate 91    Atrial Rate 91    WA Interval 132    QRS Duration 82        QTc 455    P Axis 37    R AXIS 24    T Axis 46    Interpretation ECG      Sinus rhythm  Nonspecific ST and T wave abnormality  No previous ECGs available           Independent Interpretation   CXR: Consolidation in right lower lung.    ED Course      Medications Administered   Medications   cefTRIAXone (ROCEPHIN) 1 g vial to attach to  mL bag for ADULTS or NS 50 mL bag for PEDS (has no administration in time range)   azithromycin (ZITHROMAX) 500 mg vial to attach to  mL bag (has no administration in time range)   morphine (PF) injection 4 mg (2 mg Intravenous $Given 5/9/25 1341)   iopamidol (ISOVUE-370) solution 78 mL (78 mLs Intravenous $Given 5/9/25 1624)   sodium chloride 0.9 % bag for CT scan flush (62 mLs Intravenous $Given 5/9/25 1624)       Procedures   Procedures     Discussion of Management   Admitting Hospitalist, Dr. Brown    ED Course        Additional Documentation  None    Medical Decision Making / Diagnosis     CMS Diagnoses: IV Antibiotics given and/or elevated Lactate  of 0.9 and no sepsis note found - Delete this reminder and enter the sepsis note or '.edcms' before signing chart.>>>None    MIPS            None    MDM   Penelope IMMANUEL Cisneros is a 89 year old male presenting with shortness of breath, right chest and right abdomen pain.  He is hemodynamically stable.  He has diffuse right sided pain that involves the thorax and the abdomen.  He is given morphine for pain.  Chest x-ray shows evidence of right-sided pneumonia, which could explain his pain, however he does have severe abdominal pain as well.  To further evaluate, right upper quadrant ultrasound is obtained.  This is unremarkable.  Given the degree of his pain, a CT scan is obtained to evaluate for other pathology that could explain his pain.  The CT scan also shows no etiology to his pain.  Troponin is slightly elevated but stable on 2-hour recheck.  Lactate is not elevated.  No leukocytosis.  LFTs are within normal limits.  Lipase is not elevated.  Blood cultures are obtained and he is given Rocephin and azithromycin.  I discussed the patient with the hospitalist who agreed to admit the patient to their service.  He remains in stable condition.      Disposition   The patient was admitted to the hospital.     Diagnosis     ICD-10-CM    1. Pneumonia of right lung due to infectious organism, unspecified part of lung  J18.9       2. Right-sided chest pain  R07.9       3. Right sided abdominal pain  R10.9            Discharge Medications   New Prescriptions    No medications on file         MD Abdon Douglass Stephen, MD  05/12/25 1442

## 2025-05-09 NOTE — ED NOTES
[facility]  ED Nurse Handoff Report    ED Chief complaint: Shortness of Breath      ED Diagnosis:   Final diagnoses:   Pneumonia of right lung due to infectious organism, unspecified part of lung   Right-sided chest pain   Right sided abdominal pain       Code Status: Full Code    Allergies: No Known Allergies    Patient Story: Pt sts he has had increasing SOB for weeks, also has CP. UNABLE TO GET GOOD SAT IN TRIAGE AND PT sts that is normal   Focused Assessment:  patient is visibly sob with any movement. Having severe RUQ pain with any movement. States he had recent pneumonia and has shilo coughing. C/o pain in shoulder blades and chest with movement and after eating. A& O x4, slight Unga, up with 1 assist.     Treatments and/or interventions provided: labs, US, CT  Patient's response to treatments and/or interventions: tolerated well.   Labs Ordered and Resulted from Time of ED Arrival to Time of ED Departure   COMPREHENSIVE METABOLIC PANEL - Abnormal       Result Value    Sodium 142      Potassium 4.0      Carbon Dioxide (CO2) 22      Anion Gap 11      Urea Nitrogen 45.2 (*)     Creatinine 1.64 (*)     GFR Estimate 40 (*)     Calcium 8.9      Chloride 109 (*)     Glucose 120 (*)     Alkaline Phosphatase 91      AST 17      ALT 18      Protein Total 6.7      Albumin 3.3 (*)     Bilirubin Total 0.2     CBC WITH PLATELETS AND DIFFERENTIAL - Abnormal    WBC Count 6.2      RBC Count 3.38 (*)     Hemoglobin 10.9 (*)     Hematocrit 33.4 (*)     MCV 99      MCH 32.2      MCHC 32.6      RDW 12.9      Platelet Count 256      % Neutrophils 88      % Lymphocytes 4      % Monocytes 7      % Eosinophils 1      % Basophils 0      % Immature Granulocytes 0      NRBCs per 100 WBC 0      Absolute Neutrophils 5.4      Absolute Lymphocytes 0.2 (*)     Absolute Monocytes 0.4      Absolute Eosinophils 0.1      Absolute Basophils 0.0      Absolute Immature Granulocytes 0.0      Absolute NRBCs 0.0     TROPONIN T, HIGH  SENSITIVITY - Abnormal    Troponin T, High Sensitivity 52 (*)    TROPONIN T, HIGH SENSITIVITY - Abnormal    Troponin T, High Sensitivity 46 (*)    LACTIC ACID WHOLE BLOOD WITH 1X REPEAT IN 2 HR WHEN >2 - Normal    Lactic Acid, Initial 0.9     LIPASE - Normal    Lipase 35     BLOOD CULTURE   BLOOD CULTURE     US Abdomen Limited   Final Result   IMPRESSION:   1.  Right pleural effusion.   2.  Otherwise negative right upper quadrant ultrasound.            Chest XR,  PA & LAT   Final Result   IMPRESSION: Borderline heart size. Allowing for differences in technique, new opacities in the right perihilar region and right lower lung suspicious for pneumonia. Small right pleural effusion. No significant left effusion. Calcified pleural plaques. No    pneumothorax.      CT Abdomen Pelvis w Contrast    (Results Pending)         To be done/followed up on inpatient unit:  pain control, iv abx     Does this patient have any cognitive concerns?: none    Activity level - Baseline/Home:  Independent  Activity Level - Current:   Stand with Assist    Patient's Preferred language: English   Needed?: No    Isolation: None  Infection: Not Applicable  Patient tested for COVID 19 prior to admission: NO  Bariatric?: No    Vital Signs:   Vitals:    05/09/25 1559 05/09/25 1604 05/09/25 1614 05/09/25 1619   BP:       Pulse: 87 88 89 87   Resp: 24 24 26 23   Temp:       TempSrc:       SpO2: 94% 93% 93% 93%       Cardiac Rhythm:     Was the PSS-3 completed:   Yes  What interventions are required if any?               Family Comments: daughter and son at bedside  OBS brochure/video discussed/provided to patient/family: Yes              Name of person given brochure if not patient:               Relationship to patient:     For the majority of the shift this patient's behavior was Green.   Behavioral interventions performed were .    ED NURSE PHONE NUMBER: *[phone]

## 2025-05-09 NOTE — PHARMACY-ADMISSION MEDICATION HISTORY
Pharmacist Admission Medication History    Admission medication history is complete. The information provided in this note is only as accurate as the sources available at the time of the update.    Information Source(s): Patient, Family member, Prescription bottles, and CareEverywhere/SureScripts via in-person    Pertinent Information: None    Changes made to PTA medication list:  Added: coreg, vit d3, vit c, duoneb, incruse, prednisone, ciprofloxacin  Deleted: None  Changed: None    Allergies reviewed with patient and updates made in EHR: no    Medication History Completed By: Jayy Watson RPH 5/9/2025 5:23 PM    PTA Med List   Medication Sig Last Dose/Taking    amLODIPine (NORVASC) 5 MG tablet Take 5 mg by mouth daily 5/9/2025 Morning    carvedilol (COREG) 3.125 MG tablet Take 3.125 mg by mouth 2 times daily (with meals). 5/9/2025 Morning    cholecalciferol (VITAMIN D3) 25 mcg (1000 units) capsule Take 1 capsule by mouth daily. Past Week    ciprofloxacin (CIPRO) 500 MG tablet Take 500 mg by mouth daily as needed (diverticulitis). Taking As Needed    ipratropium - albuterol 0.5 mg/2.5 mg/3 mL (DUONEB) 0.5-2.5 (3) MG/3ML neb solution Take 1 vial by nebulization every 6 hours as needed for shortness of breath, wheezing or cough. Taking As Needed    Multiple Vitamin (MULTIVITAMIN PO) Take 1 tablet by mouth daily. Past Week    Multiple Vitamins-Minerals (OCUVITE PRESERVISION PO) Take 1 tablet by mouth daily. Past Week    predniSONE (DELTASONE) 20 MG tablet Take 20 mg by mouth 2 times daily as needed (gout flare). Taking As Needed    umeclidinium (INCRUSE ELLIPTA) 62.5 MCG/ACT inhaler Inhale 1 puff into the lungs daily (with lunch). 5/8/2025    vitamin C (ASCORBIC ACID) 500 MG tablet Take 500 mg by mouth daily. Past Week

## 2025-05-10 ENCOUNTER — APPOINTMENT (OUTPATIENT)
Dept: ULTRASOUND IMAGING | Facility: CLINIC | Age: OVER 89
DRG: 193 | End: 2025-05-10
Attending: INTERNAL MEDICINE
Payer: MEDICARE

## 2025-05-10 LAB
ALBUMIN SERPL BCG-MCNC: 2.9 G/DL (ref 3.5–5.2)
ALP SERPL-CCNC: 83 U/L (ref 40–150)
ALT SERPL W P-5'-P-CCNC: 14 U/L (ref 0–70)
ANION GAP SERPL CALCULATED.3IONS-SCNC: 14 MMOL/L (ref 7–15)
APPEARANCE FLD: ABNORMAL
AST SERPL W P-5'-P-CCNC: 11 U/L (ref 0–45)
BASOPHILS # BLD AUTO: 0 10E3/UL (ref 0–0.2)
BASOPHILS NFR BLD AUTO: 0 %
BILIRUB SERPL-MCNC: 0.2 MG/DL
BUN SERPL-MCNC: 39 MG/DL (ref 8–23)
CALCIUM SERPL-MCNC: 8.8 MG/DL (ref 8.8–10.4)
CHLORIDE SERPL-SCNC: 111 MMOL/L (ref 98–107)
COLOR FLD: YELLOW
CREAT SERPL-MCNC: 1.68 MG/DL (ref 0.67–1.17)
EGFRCR SERPLBLD CKD-EPI 2021: 39 ML/MIN/1.73M2
EOSINOPHIL # BLD AUTO: 0.1 10E3/UL (ref 0–0.7)
EOSINOPHIL NFR BLD AUTO: 1 %
ERYTHROCYTE [DISTWIDTH] IN BLOOD BY AUTOMATED COUNT: 12.9 % (ref 10–15)
GLUCOSE BODY FLUID SOURCE: NORMAL
GLUCOSE FLD-MCNC: 64 MG/DL
GLUCOSE SERPL-MCNC: 88 MG/DL (ref 70–99)
HCO3 SERPL-SCNC: 17 MMOL/L (ref 22–29)
HCT VFR BLD AUTO: 32.6 % (ref 40–53)
HGB BLD-MCNC: 10.4 G/DL (ref 13.3–17.7)
IMM GRANULOCYTES # BLD: 0 10E3/UL
IMM GRANULOCYTES NFR BLD: 0 %
LD BODY BODY FLUID SOURCE: NORMAL
LDH FLD L TO P-CCNC: 277 U/L
LDH SERPL L TO P-CCNC: 96 U/L (ref 0–250)
LYMPHOCYTES # BLD AUTO: 0.4 10E3/UL (ref 0.8–5.3)
LYMPHOCYTES NFR BLD AUTO: 6 %
LYMPHOCYTES NFR FLD MANUAL: 12 %
MCH RBC QN AUTO: 32.1 PG (ref 26.5–33)
MCHC RBC AUTO-ENTMCNC: 31.9 G/DL (ref 31.5–36.5)
MCV RBC AUTO: 101 FL (ref 78–100)
MONOCYTES # BLD AUTO: 0.5 10E3/UL (ref 0–1.3)
MONOCYTES NFR BLD AUTO: 8 %
MONOS+MACROS NFR FLD MANUAL: 9 %
NEUTROPHILS # BLD AUTO: 5.6 10E3/UL (ref 1.6–8.3)
NEUTROPHILS NFR BLD AUTO: 86 %
NEUTS BAND NFR FLD MANUAL: 79 %
NRBC # BLD AUTO: 0 10E3/UL
NRBC BLD AUTO-RTO: 0 /100
PLATELET # BLD AUTO: 257 10E3/UL (ref 150–450)
POTASSIUM SERPL-SCNC: 4.3 MMOL/L (ref 3.4–5.3)
PROCALCITONIN SERPL IA-MCNC: 0.97 NG/ML
PROT FLD-MCNC: 3.7 G/DL
PROT SERPL-MCNC: 6.1 G/DL (ref 6.4–8.3)
PROTEIN BODY FLUID SOURCE: NORMAL
RBC # BLD AUTO: 3.24 10E6/UL (ref 4.4–5.9)
RBC # FLD: 2 /UL
SODIUM SERPL-SCNC: 142 MMOL/L (ref 135–145)
SPECIMEN SOURCE FLD: ABNORMAL
WBC # BLD AUTO: 6.6 10E3/UL (ref 4–11)
WBC # FLD AUTO: 1176 /UL

## 2025-05-10 PROCEDURE — 272N000706 US THORACENTESIS

## 2025-05-10 PROCEDURE — 99222 1ST HOSP IP/OBS MODERATE 55: CPT | Performed by: INTERNAL MEDICINE

## 2025-05-10 PROCEDURE — 250N000009 HC RX 250: Performed by: RADIOLOGY

## 2025-05-10 PROCEDURE — 250N000013 HC RX MED GY IP 250 OP 250 PS 637: Performed by: INTERNAL MEDICINE

## 2025-05-10 PROCEDURE — 80053 COMPREHEN METABOLIC PANEL: CPT | Performed by: HOSPITALIST

## 2025-05-10 PROCEDURE — 83615 LACTATE (LD) (LDH) ENZYME: CPT | Performed by: INTERNAL MEDICINE

## 2025-05-10 PROCEDURE — 999N000128 HC STATISTIC PERIPHERAL IV START W/O US GUIDANCE

## 2025-05-10 PROCEDURE — 250N000011 HC RX IP 250 OP 636: Performed by: HOSPITALIST

## 2025-05-10 PROCEDURE — 89050 BODY FLUID CELL COUNT: CPT | Performed by: INTERNAL MEDICINE

## 2025-05-10 PROCEDURE — 87070 CULTURE OTHR SPECIMN AEROBIC: CPT | Performed by: INTERNAL MEDICINE

## 2025-05-10 PROCEDURE — 36415 COLL VENOUS BLD VENIPUNCTURE: CPT | Performed by: HOSPITALIST

## 2025-05-10 PROCEDURE — 99233 SBSQ HOSP IP/OBS HIGH 50: CPT | Performed by: INTERNAL MEDICINE

## 2025-05-10 PROCEDURE — 84157 ASSAY OF PROTEIN OTHER: CPT | Performed by: INTERNAL MEDICINE

## 2025-05-10 PROCEDURE — 258N000003 HC RX IP 258 OP 636: Performed by: HOSPITALIST

## 2025-05-10 PROCEDURE — 250N000013 HC RX MED GY IP 250 OP 250 PS 637: Performed by: HOSPITALIST

## 2025-05-10 PROCEDURE — 85025 COMPLETE CBC W/AUTO DIFF WBC: CPT | Performed by: HOSPITALIST

## 2025-05-10 PROCEDURE — 0W993ZX DRAINAGE OF RIGHT PLEURAL CAVITY, PERCUTANEOUS APPROACH, DIAGNOSTIC: ICD-10-PCS | Performed by: RADIOLOGY

## 2025-05-10 PROCEDURE — 88112 CYTOPATH CELL ENHANCE TECH: CPT | Mod: TC | Performed by: INTERNAL MEDICINE

## 2025-05-10 PROCEDURE — 82945 GLUCOSE OTHER FLUID: CPT | Performed by: INTERNAL MEDICINE

## 2025-05-10 PROCEDURE — 120N000001 HC R&B MED SURG/OB

## 2025-05-10 RX ORDER — ASPIRIN 81 MG/1
81 TABLET ORAL DAILY
Status: DISCONTINUED | OUTPATIENT
Start: 2025-05-10 | End: 2025-05-14 | Stop reason: HOSPADM

## 2025-05-10 RX ORDER — LIDOCAINE HYDROCHLORIDE 10 MG/ML
10 INJECTION, SOLUTION EPIDURAL; INFILTRATION; INTRACAUDAL; PERINEURAL ONCE
Status: COMPLETED | OUTPATIENT
Start: 2025-05-10 | End: 2025-05-10

## 2025-05-10 RX ORDER — ATORVASTATIN CALCIUM 20 MG/1
20 TABLET, FILM COATED ORAL EVERY EVENING
Status: DISCONTINUED | OUTPATIENT
Start: 2025-05-10 | End: 2025-05-11

## 2025-05-10 RX ADMIN — METHOCARBAMOL 500 MG: 500 TABLET ORAL at 20:06

## 2025-05-10 RX ADMIN — METHOCARBAMOL 500 MG: 500 TABLET ORAL at 16:34

## 2025-05-10 RX ADMIN — OXYCODONE HYDROCHLORIDE 5 MG: 5 TABLET ORAL at 18:46

## 2025-05-10 RX ADMIN — CARVEDILOL 3.12 MG: 3.12 TABLET, FILM COATED ORAL at 09:06

## 2025-05-10 RX ADMIN — LIDOCAINE 1 PATCH: 4 PATCH TOPICAL at 20:05

## 2025-05-10 RX ADMIN — AMLODIPINE BESYLATE 5 MG: 5 TABLET ORAL at 09:07

## 2025-05-10 RX ADMIN — UMECLIDINIUM 1 PUFF: 62.5 AEROSOL, POWDER ORAL at 12:25

## 2025-05-10 RX ADMIN — ATORVASTATIN CALCIUM 20 MG: 20 TABLET, FILM COATED ORAL at 20:06

## 2025-05-10 RX ADMIN — HYDROMORPHONE HYDROCHLORIDE 0.2 MG: 0.2 INJECTION, SOLUTION INTRAMUSCULAR; INTRAVENOUS; SUBCUTANEOUS at 11:07

## 2025-05-10 RX ADMIN — ACETAMINOPHEN 650 MG: 325 TABLET, FILM COATED ORAL at 10:55

## 2025-05-10 RX ADMIN — HEPARIN SODIUM 5000 UNITS: 5000 INJECTION, SOLUTION INTRAVENOUS; SUBCUTANEOUS at 04:36

## 2025-05-10 RX ADMIN — ASPIRIN 81 MG: 81 TABLET, COATED ORAL at 18:46

## 2025-05-10 RX ADMIN — AZITHROMYCIN MONOHYDRATE 250 MG: 500 INJECTION, POWDER, LYOPHILIZED, FOR SOLUTION INTRAVENOUS at 20:05

## 2025-05-10 RX ADMIN — CEFTRIAXONE SODIUM 2 G: 2 INJECTION, POWDER, FOR SOLUTION INTRAMUSCULAR; INTRAVENOUS at 16:34

## 2025-05-10 RX ADMIN — METHOCARBAMOL 500 MG: 500 TABLET ORAL at 09:07

## 2025-05-10 RX ADMIN — METHOCARBAMOL 500 MG: 500 TABLET ORAL at 12:25

## 2025-05-10 RX ADMIN — CARVEDILOL 3.12 MG: 3.12 TABLET, FILM COATED ORAL at 18:46

## 2025-05-10 RX ADMIN — LIDOCAINE HYDROCHLORIDE ANHYDROUS 10 ML: 10 INJECTION, SOLUTION INFILTRATION at 10:06

## 2025-05-10 RX ADMIN — OXYCODONE HYDROCHLORIDE 5 MG: 5 TABLET ORAL at 04:36

## 2025-05-10 ASSESSMENT — ACTIVITIES OF DAILY LIVING (ADL)
TOILETING_ISSUES: NO
ADLS_ACUITY_SCORE: 42
DOING_ERRANDS_INDEPENDENTLY_DIFFICULTY: YES
ADLS_ACUITY_SCORE: 30
ADLS_ACUITY_SCORE: 42
ADLS_ACUITY_SCORE: 24
DRESSING/BATHING_DIFFICULTY: NO
ADLS_ACUITY_SCORE: 24
ADLS_ACUITY_SCORE: 30
ADLS_ACUITY_SCORE: 42
ADLS_ACUITY_SCORE: 36
WALKING_OR_CLIMBING_STAIRS_DIFFICULTY: NO
ADLS_ACUITY_SCORE: 24
FALL_HISTORY_WITHIN_LAST_SIX_MONTHS: NO
ADLS_ACUITY_SCORE: 44
ADLS_ACUITY_SCORE: 42
ADLS_ACUITY_SCORE: 24
ADLS_ACUITY_SCORE: 30
WEAR_GLASSES_OR_BLIND: NO
ADLS_ACUITY_SCORE: 44
ADLS_ACUITY_SCORE: 30
ADLS_ACUITY_SCORE: 42
ADLS_ACUITY_SCORE: 44
ADLS_ACUITY_SCORE: 30
ADLS_ACUITY_SCORE: 30

## 2025-05-10 NOTE — CONSULTS
"IR    US guided right thoracentesis with removal of 550 ml of yellow fluid.  Patient had \"27/10\" pain prior to the procedure and about same after.  However, patient could not tolerate removal of additional fluid, with small residual.    Everardo Dooley MD  "

## 2025-05-10 NOTE — PLAN OF CARE
Goal Outcome Evaluation:    Summary:  pneumonia, chest pain  DATE & TIME: 5/9/25 1435-1178    Cognitive Concerns/ Orientation : A&O x4, [place]   BEHAVIOR & AGGRESSION TOOL COLOR: Green  CIWA SCORE: NA   ABNL VS/O2: VSS on 2L NC  MOBILITY: 1A GB+W  PAIN MANAGMENT: PRN tylenol & scheduled oxycodone given for pleuritic chest pain  DIET: regular  BOWEL/BLADDER: urinal at bedside, no bm this shift  ABNL LAB/BG: sputum sample still needed  DRAIN/DEVICES: PIV SL, intermittent abx  TELEMETRY RHYTHM: NA  SKIN: Dry and flaky- abrasions to lower extremities   TESTS/PROCEDURES:   D/C DAY/GOALS/PLACE: pending improvement  OTHER IMPORTANT INFO: hem/onc consult pending, unable to get sputum sample- non productive cough

## 2025-05-10 NOTE — CONSULTS
Consultation    Penelope Cisneros MRN# [id number]   YOB: 1935 Age: 89 year old   Date of Admission: 5/9/2025  Requesting physician: Imtiaz Tatum  Reason for consult: Pancreatic cyst           Assessment and Plan:     Impression: patient is sleeping comfortably (history obtained from daughter at bedside)    Incidental pancreatic head cystic lesion (1.7 cm), presumed IPMN?  Pleuritic chest discomfort/ R-CAP  Hx RUL SCLC, currently LILLI  Mild macrocytic anemia  CKD III    Recommendations:    Agree with ongoing supportive care and antibiotics  Outpatient referral to GI (MRCP +/-  EUS). Oncology involvement if proven malignancy.  Consider outpatient Retics, PBS, TSH, B12/MMA, SPEP/SFLC (if not done recently)  Follow-up with primary oncologist (as previously planned).        Brooke Nguyễn MD  [facility]  [phone] (office)             Chief Complaint:   Shortness of Breath           History of Present Illness:   This patient is a 89 year old male, familiar to [facility] (a patient of Dr Kingsley).  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  He completed a course of CRT (carboplatin/etoposide x4), followed with a dose of atezolizumab (intolerant).  He enjoyed a durable remission thereafter (last assessed in January 2025).  Next follow-up planned in July 2025    His medical history is also notable for HTN, COPD, CKD 3, AAA, moderate AR, mild MR/TR, and diastolic CHF.  He had several hospitalizations over the past couple of months, treated for pneumonia's and CHF.     On 5/9/2025, he presented to the ED with c/o SOB and a pleuritic chest discomfort.  Has been treated for R-sided CAP.  Oncology consultation was requested due to an incidental finding of a 1.7 cm pancreatic head cystic focus (presumed IPMN).             Physical Exam:   Vitals were reviewed  Blood pressure 136/66, pulse 88, temperature 98.7  F (37.1  C),  temperature source Oral, resp. rate 20, weight 60.7 kg (133 lb 13.1 oz), SpO2 94%.  Temperatures:  Current - Temp: 98.7  F (37.1  C); Max - Temp  Av.2  F (37.3  C)  Min: 97.9  F (36.6  C)  Max: 100  F (37.8  C)  Respiration range: Resp  Av.4  Min: 20  Max: 30  Pulse range: Pulse  Av.5  Min: 64  Max: 94  Blood pressure range: Systolic (24hrs), Av , Min:136 , Max:149   ; Diastolic (24hrs), Av, Min:66, Max:74    Pulse oximetry range: SpO2  Av.5 %  Min: 90 %  Max: 97 %    Intake/Output Summary (Last 24 hours) at 5/10/2025 1016  Last data filed at 5/10/2025 0914  Gross per 24 hour   Intake --   Output 650 ml   Net -650 ml       General: comfortable, in no acute distress.  Cardiac: RRR  Chest: CTA  Abdomen: Soft, NT, NABS.  Neurologic: Grossly intact.               Past Medical History:   I have reviewed this patient's past medical history  Past Medical History:   Diagnosis Date    Gout     Personal history of chemotherapy     Carboplatin/Etoposide (cycle 1: 2022 - 2022)    S/P radiation therapy     6,600 cGy to RUL, [place] completed on 2023 - [facility]    Small cell lung cancer (H) 10/20/2022             Past Surgical History:   I have reviewed this patient's past surgical history  Past Surgical History:   Procedure Laterality Date    APPENDECTOMY      ARTHROSCOPY SHOULDER Left     IR LUNG BIOPSY MEDIASTINUM RIGHT  10/20/2022     REPAIR CLEFT PALATE  1937    ROTATOR CUFF REPAIR RT/LT Right     TONSILLECTOMY                 Social History:   I have reviewed this patient's social history  Social History     Tobacco Use    Smoking status: Every Day     Current packs/day: 1.00     Average packs/day: 1 pack/day for 65.0 years (65.0 ttl pk-yrs)     Types: Cigarettes    Smokeless tobacco: Never    Tobacco comments:     Patient reports currently smoking less than one pack per week - updated 2022   Substance Use Topics    Alcohol use: Yes      Alcohol/week: 14.0 standard drinks of alcohol     Types: 14 Standard drinks or equivalent per week     Comment: Patient reports 2 [place] + [employer] drinks per day             Family History:   I have reviewed this patient's family history  Family History   Problem Relation Age of Onset    Cancer Father         Unknown type per patient    Prostate Cancer Brother              Allergies:   No Known Allergies          Medications:   I have reviewed this patient's current medications  Medications Prior to Admission   Medication Sig Dispense Refill Last Dose/Taking    amLODIPine (NORVASC) 5 MG tablet Take 5 mg by mouth daily   5/9/2025 Morning    carvedilol (COREG) 3.125 MG tablet Take 3.125 mg by mouth 2 times daily (with meals).   5/9/2025 Morning    cholecalciferol (VITAMIN D3) 25 mcg (1000 units) capsule Take 1 capsule by mouth daily.   Past Week    ciprofloxacin (CIPRO) 500 MG tablet Take 500 mg by mouth daily as needed (diverticulitis).   Taking As Needed    ipratropium - albuterol 0.5 mg/2.5 mg/3 mL (DUONEB) 0.5-2.5 (3) MG/3ML neb solution Take 1 vial by nebulization every 6 hours as needed for shortness of breath, wheezing or cough.   Taking As Needed    Multiple Vitamin (MULTIVITAMIN PO) Take 1 tablet by mouth daily.   Past Week    Multiple Vitamins-Minerals (OCUVITE PRESERVISION PO) Take 1 tablet by mouth daily.   Past Week    predniSONE (DELTASONE) 20 MG tablet Take 20 mg by mouth 2 times daily as needed (gout flare).   Taking As Needed    umeclidinium (INCRUSE ELLIPTA) 62.5 MCG/ACT inhaler Inhale 1 puff into the lungs daily (with lunch).   5/8/2025    vitamin C (ASCORBIC ACID) 500 MG tablet Take 500 mg by mouth daily.   Past Week     Current Facility-Administered Medications   Medication Dose Route Frequency Provider Last Rate Last Admin    acetaminophen (TYLENOL) tablet 650 mg  650 mg Oral Q4H PRN Imtiaz Brown MD   650 mg at 05/09/25 2104    Or    acetaminophen (TYLENOL) Suppository 650 mg  650 mg  Rectal Q4H PRN Imtiaz Brown MD        amLODIPine (NORVASC) tablet 5 mg  5 mg Oral Daily Imtiaz Brown MD   5 mg at 05/10/25 0907    azithromycin (ZITHROMAX) 250 mg in sodium chloride 0.9 % 250 mL intermittent infusion  250 mg Intravenous Q24H Imtiaz Brown MD        calcium carbonate (TUMS) chewable tablet 1,000 mg  1,000 mg Oral 4x Daily PRN Imtiaz Brown MD        carvedilol (COREG) tablet 3.125 mg  3.125 mg Oral BID w/meals Imtiaz Brown MD   3.125 mg at 05/10/25 0906    cefTRIAXone (ROCEPHIN) 2 g vial to attach to  ml bag for ADULTS or NS 50 ml bag for PEDS  2 g Intravenous Q24H Imtiaz Brown MD        guaiFENesin-dextromethorphan (ROBITUSSIN DM) 100-10 MG/5ML syrup 10 mL  10 mL Oral Q4H PRN Imtiaz Brown MD        hydrALAZINE (APRESOLINE) injection 10 mg  10 mg Intravenous Q4H PRN Imtiaz Brown MD        HYDROmorphone (DILAUDID) injection 0.2 mg  0.2 mg Intravenous Q2H PRN Imtiaz Brown MD        HYDROmorphone (DILAUDID) injection 0.4 mg  0.4 mg Intravenous Q2H PRN Imtiaz Brown MD        ipratropium - albuterol 0.5 mg/2.5 mg/3 mL (DUONEB) neb solution 3 mL  3 mL Nebulization Q4H PRN Imtiaz Brown MD        Lidocaine (LIDOCARE) 4 % Patch 1 patch  1 patch Transdermal Q24h Imtiaz Brown MD   1 patch at 05/09/25 2102    lidocaine (LMX4) cream   Topical Q1H PRN Imtiaz Brown MD        lidocaine 1 % 0.1-1 mL  0.1-1 mL Other Q1H PRN Imtiaz Brown MD        methocarbamol (ROBAXIN) tablet 500 mg  500 mg Oral 4x Daily Imtiaz Brown MD   500 mg at 05/10/25 0907    naloxone (NARCAN) injection 0.2 mg  0.2 mg Intravenous Q2 Min PRN Imtiaz Brown MD        Or    naloxone (NARCAN) injection 0.4 mg  0.4 mg Intravenous Q2 Min PRN Imtiaz Brown MD        Or    naloxone (NARCAN) injection 0.2 mg  0.2 mg Intramuscular Q2 Min PRN Imtiaz Brown MD        Or    naloxone (NARCAN)  injection 0.4 mg  0.4 mg Intramuscular Q2 Min PRN Imtiaz Brown MD        ondansetron (ZOFRAN ODT) ODT tab 4 mg  4 mg Oral Q6H PRN Imtiaz Brown MD        Or    ondansetron (ZOFRAN) injection 4 mg  4 mg Intravenous Q6H PRN Imtiaz Brown MD        oxyCODONE (ROXICODONE) tablet 5 mg  5 mg Oral Q4H PRN Imtiza Brwon MD   5 mg at 05/10/25 0436    oxyCODONE IR (ROXICODONE) half-tab 2.5 mg  2.5 mg Oral Q4H PRN Imtiaz Brown MD        senna-docusate (SENOKOT-S/PERICOLACE) 8.6-50 MG per tablet 1 tablet  1 tablet Oral BID PRN Imtiaz Brown MD        Or    senna-docusate (SENOKOT-S/PERICOLACE) 8.6-50 MG per tablet 2 tablet  2 tablet Oral BID PRN Imtiaz Brown MD        sodium chloride (PF) 0.9% PF flush 3 mL  3 mL Intracatheter Q8H [facility] Imtiaz Brown MD   3 mL at 05/10/25 0437    sodium chloride (PF) 0.9% PF flush 3 mL  3 mL Intracatheter q1 min prn Imtiaz Brown MD        umeclidinium (INCRUSE ELLIPTA) 62.5 MCG/ACT inhaler 1 puff  1 puff Inhalation Daily with lunch Imtiaz Brown MD                 Review of Systems:     The 14 point Review of Systems is negative other than noted in the HPI.            Data:   Data   Results for orders placed or performed during the hospital encounter of 05/09/25 (from the past 24 hours)   EKG 12-lead, tracing only   Result Value Ref Range    Systolic Blood Pressure  mmHg    Diastolic Blood Pressure  mmHg    Ventricular Rate 91 BPM    Atrial Rate 91 BPM    AR Interval 132 ms    QRS Duration 82 ms     ms    QTc 455 ms    P Axis 37 degrees    R AXIS 24 degrees    T Axis 46 degrees    Interpretation ECG       Sinus rhythm  Nonspecific ST and T wave abnormality  Abnormal ECG  No previous ECGs available  Confirmed by GENERATED REPORT, COMPUTER ([id number]),  ARISTEO TERESA ([id number]) on 5/9/2025 10:42:58 AM     CBC with platelets differential    Narrative    The following orders were created for panel order CBC with  platelets differential.  Procedure                               Abnormality         Status                     ---------                               -----------         ------                     CBC with platelets and ...[[id number]]  Abnormal            Final result                 Please view results for these tests on the individual orders.   Comprehensive metabolic panel   Result Value Ref Range    Sodium 142 135 - 145 mmol/L    Potassium 4.0 3.4 - 5.3 mmol/L    Carbon Dioxide (CO2) 22 22 - 29 mmol/L    Anion Gap 11 7 - 15 mmol/L    Urea Nitrogen 45.2 (H) 8.0 - 23.0 mg/dL    Creatinine 1.64 (H) 0.67 - 1.17 mg/dL    GFR Estimate 40 (L) >60 mL/min/1.73m2    Calcium 8.9 8.8 - 10.4 mg/dL    Chloride 109 (H) 98 - 107 mmol/L    Glucose 120 (H) 70 - 99 mg/dL    Alkaline Phosphatase 91 40 - 150 U/L    AST 17 0 - 45 U/L    ALT 18 0 - 70 U/L    Protein Total 6.7 6.4 - 8.3 g/dL    Albumin 3.3 (L) 3.5 - 5.2 g/dL    Bilirubin Total 0.2 <=1.2 mg/dL   Lactic Acid Whole Blood with 1X Repeat in 2 HR when >2   Result Value Ref Range    Lactic Acid, Initial 0.9 0.7 - 2.0 mmol/L   [place] Draw    Narrative    The following orders were created for panel order [place] Draw.  Procedure                               Abnormality         Status                     ---------                               -----------         ------                     Extra Blue Top Tube[[id number]]                             Final result                 Please view results for these tests on the individual orders.   CBC with platelets and differential   Result Value Ref Range    WBC Count 6.2 4.0 - 11.0 10e3/uL    RBC Count 3.38 (L) 4.40 - 5.90 10e6/uL    Hemoglobin 10.9 (L) 13.3 - 17.7 g/dL    Hematocrit 33.4 (L) 40.0 - 53.0 %    MCV 99 78 - 100 fL    MCH 32.2 26.5 - 33.0 pg    MCHC 32.6 31.5 - 36.5 g/dL    RDW 12.9 10.0 - 15.0 %    Platelet Count 256 150 - 450 10e3/uL    % Neutrophils 88 %    % Lymphocytes 4 %    % Monocytes 7 %    % Eosinophils 1  %    % Basophils 0 %    % Immature Granulocytes 0 %    NRBCs per 100 WBC 0 <1 /100    Absolute Neutrophils 5.4 1.6 - 8.3 10e3/uL    Absolute Lymphocytes 0.2 (L) 0.8 - 5.3 10e3/uL    Absolute Monocytes 0.4 0.0 - 1.3 10e3/uL    Absolute Eosinophils 0.1 0.0 - 0.7 10e3/uL    Absolute Basophils 0.0 0.0 - 0.2 10e3/uL    Absolute Immature Granulocytes 0.0 <=0.4 10e3/uL    Absolute NRBCs 0.0 10e3/uL   Extra Blue Top Tube   Result Value Ref Range    Hold Specimen JIC    Troponin T, High Sensitivity   Result Value Ref Range    Troponin T, High Sensitivity 52 (H) <=22 ng/L   Lipase   Result Value Ref Range    Lipase 35 13 - 60 U/L   Chest XR,  PA & LAT    Narrative    EXAM: XR CHEST 2 VIEWS  LOCATION: [facility]  DATE: 5/9/2025    INDICATION: SOB, cough  COMPARISON: PET/CT 1/15/2025      Impression    IMPRESSION: Borderline heart size. Allowing for differences in technique, new opacities in the right perihilar region and right lower lung suspicious for pneumonia. Small right pleural effusion. No significant left effusion. Calcified pleural plaques. No   pneumothorax.   US Abdomen Limited    Narrative    EXAM: US ABDOMEN LIMITED  LOCATION: [facility]  DATE: 5/9/2025    INDICATION: RUQ pain  COMPARISON: PET CT 1/15/2025  TECHNIQUE: Limited abdominal ultrasound.    FINDINGS:    GALLBLADDER: No definite gallstones. The gallbladder is decompressed.    BILE DUCTS: No biliary dilatation. The common duct measures 2 mm.    LIVER: Normal parenchyma with smooth contour. No focal mass. The portal vein is patent with flow in the normal direction.    RIGHT KIDNEY: No hydronephrosis. Benign cyst.    PANCREAS: The visualized portions are normal.    No ascites. Right pleural effusion noted.      Impression    IMPRESSION:  1.  Right pleural effusion.  2.  Otherwise negative right upper quadrant ultrasound.       Troponin T, High Sensitivity   Result Value Ref Range    Troponin T, High  Sensitivity 46 (H) <=22 ng/L   [place] Draw    Narrative    The following orders were created for panel order [place] Draw.  Procedure                               Abnormality         Status                     ---------                               -----------         ------                     Extra Green Top (Lithiu...[[id number]]                      Final result                 Please view results for these tests on the individual orders.   Extra Green Top (Lithium Heparin) Tube   Result Value Ref Range    Hold Specimen JIC    Procalcitonin   Result Value Ref Range    Procalcitonin 0.97 (H) <0.50 ng/mL   CT Abdomen Pelvis w Contrast    Narrative    EXAM: CT ABDOMEN PELVIS W CONTRAST  LOCATION: [facility]  DATE: 5/9/2025    INDICATION: Right sided ab pain  COMPARISON: 5/9/2025  TECHNIQUE: CT scan of the abdomen and pelvis was performed following injection of IV contrast. Multiplanar reformats were obtained. Dose reduction techniques were used.  CONTRAST: 78mL Isovue 370    FINDINGS:   LOWER CHEST: Moderate right pleural effusion. Bilateral pleural calcifications. Mitral annular calcifications.    HEPATOBILIARY: Normal.    PANCREAS: Stable 1.7 x 1.7 cm cystic focus in the head of the pancreas.    SPLEEN: Normal.    ADRENAL GLANDS: Normal.    KIDNEYS/BLADDER: Mild bladder wall thickening. Simple right renal cyst. No hydroureteronephrosis.    BOWEL: Diverticulosis. No diverticulitis, colitis, or obstruction. No free air or fluid. Trace free fluid.    LYMPH NODES: Normal.    VASCULATURE: 4.9 x 4.7 x 8.2 cm fusiform infrarenal abdominal aortic aneurysm not significantly changed since most recent previous study.    PELVIC ORGANS: Moderately enlarged prostate.    MUSCULOSKELETAL: Moderate multilevel discogenic degenerative change.      Impression    IMPRESSION:   1.  4.9 x 4.7 cm infrarenal abdominal aortic aneurysm, unchanged.  2.  Small right pleural effusion.  3.  Stable pleural  thickening with pleural calcifications.  4.  Mild bladder wall thickening, correlate to exclude cystitis.  5.  Moderately enlarged prostate.  6.  Diverticulosis. No diverticulitis, colitis, or obstruction.  7.  1.7 x 1.7 cm cystic focus in the pancreatic head, possibly in intraductal papillary mucinous neoplasm, recommend continued attention to detail and surveillance imaging    REFERENCE:  Revisions of international consensus [place] guidelines for the management of IPMN of the pancreas. Pancreatology 2017;17(5):738-753.    Largest cyst between 10-20 mm: CT or MRI/MRCP every 6 months for 1 year and then yearly for 2 years and then every 2 years if no change.      Blood Culture Peripheral blood (BC) Arm, Right    Specimen: Arm, Right; Peripheral blood (BC)   Result Value Ref Range    Culture No growth after 12 hours    Blood Culture Peripheral blood (BC) Arm, Left    Specimen: Arm, Left; Peripheral blood (BC)   Result Value Ref Range    Culture No growth after 12 hours    Legionella Urinary Antigen and Streptococcus pneumoniae antigen    Specimen: Urine, Clean Catch   Result Value Ref Range    Legionella pneumophila serogroup 1 urinary antigen Negative Negative    Streptococcus pneumoniae antigen Negative Negative    Legionella pneumophila Urinary/Strep pneumoniae Antigen Specimen Type Urine     Narrative    The result of this test as well as culture, serology, or other antigen detection methods should be used in conjunction with clinical findings to make an accurate diagnosis.   CBC with platelets differential    Narrative    The following orders were created for panel order CBC with platelets differential.  Procedure                               Abnormality         Status                     ---------                               -----------         ------                     CBC with platelets and ...[[id number]]  Abnormal            Final result                 Please view results for these tests on the  individual orders.   Comprehensive metabolic panel   Result Value Ref Range    Sodium 142 135 - 145 mmol/L    Potassium 4.3 3.4 - 5.3 mmol/L    Carbon Dioxide (CO2) 17 (L) 22 - 29 mmol/L    Anion Gap 14 7 - 15 mmol/L    Urea Nitrogen 39.0 (H) 8.0 - 23.0 mg/dL    Creatinine 1.68 (H) 0.67 - 1.17 mg/dL    GFR Estimate 39 (L) >60 mL/min/1.73m2    Calcium 8.8 8.8 - 10.4 mg/dL    Chloride 111 (H) 98 - 107 mmol/L    Glucose 88 70 - 99 mg/dL    Alkaline Phosphatase 83 40 - 150 U/L    AST 11 0 - 45 U/L    ALT 14 0 - 70 U/L    Protein Total 6.1 (L) 6.4 - 8.3 g/dL    Albumin 2.9 (L) 3.5 - 5.2 g/dL    Bilirubin Total 0.2 <=1.2 mg/dL   Lactate Dehydrogenase   Result Value Ref Range    Lactate Dehydrogenase 96 0 - 250 U/L   CBC with platelets and differential   Result Value Ref Range    WBC Count 6.6 4.0 - 11.0 10e3/uL    RBC Count 3.24 (L) 4.40 - 5.90 10e6/uL    Hemoglobin 10.4 (L) 13.3 - 17.7 g/dL    Hematocrit 32.6 (L) 40.0 - 53.0 %     (H) 78 - 100 fL    MCH 32.1 26.5 - 33.0 pg    MCHC 31.9 31.5 - 36.5 g/dL    RDW 12.9 10.0 - 15.0 %    Platelet Count 257 150 - 450 10e3/uL    % Neutrophils 86 %    % Lymphocytes 6 %    % Monocytes 8 %    % Eosinophils 1 %    % Basophils 0 %    % Immature Granulocytes 0 %    NRBCs per 100 WBC 0 <1 /100    Absolute Neutrophils 5.6 1.6 - 8.3 10e3/uL    Absolute Lymphocytes 0.4 (L) 0.8 - 5.3 10e3/uL    Absolute Monocytes 0.5 0.0 - 1.3 10e3/uL    Absolute Eosinophils 0.1 0.0 - 0.7 10e3/uL    Absolute Basophils 0.0 0.0 - 0.2 10e3/uL    Absolute Immature Granulocytes 0.0 <=0.4 10e3/uL    Absolute NRBCs 0.0 10e3/uL

## 2025-05-10 NOTE — PLAN OF CARE
[phone]  A&O x4, Pt arrived in the unit at 1830, settled in bed, reported right sided chest pain, oxycodone given, on 2L NC

## 2025-05-10 NOTE — PROGRESS NOTES
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     He has been having some shortness of breath for several weeks now. In February he was treated for pneumonia with a course of Ciprofloxacin and doxycycline. He then was admitted mid March with pneumonia (left basilar infiltrate) and was treated with a course of levofloxacin/Cefepime while hospitalized and then completed course of oral levofloxacin. During that hospitalization he was also noted with some diastolic CHF and some valvular abnormalities. He reports he was feeling better but then past couple of days started having worsening shortness of breath again with sharp pleuritic chest pain worse with any movement or deep inspiration. Reports subjective fever and chills. He was supposed to go to a cardiology clinic for follow-up but presented to ED with above symptoms.     Right-sided community acquired pneumonia  Right-sided pleural effusion s/p thoracentesis on 5/10/2025 with the 550 mL of fluid removed  right-sided chest pain, likely pleuritic/musculoskeletal secondary to above  acute hypoxic respiratory failure secondary to above  likely type II non-STEMI secondary to above  chronic right pleural effusion  COPD, not an exacerbation  -recent hospitalization for left sided pneumonia and initial presentation as noted above  -now presents with acute right chest pain with cough and shortness of breath, likely pleuritic  -afebrile here, normal WBC; troponin elevated without significant trend 52--- 46  -EKG normal sinus  "rhythm  -chest x-ray noted new opacities in right jonas-hilar region and right lower lobe suggested for pneumonia; small right pleural effusion  -ultrasound abdomen did nothing acute  -LFTs unremarkable  CT chest showed moderate right-sided pleural effusion underwent thoracentesis by IR.  550 mL of fluid was removed there was still some residual effusion could not be removed due to worsening pain as per the patient  Patient was given Dilaudid right after arrival to the floor and pain well-controlled currently  On IV ceftriaxone and Zithromax for antibiotics urine Legionella and strep antigens returned negative  Pleural fluid cultures and cytology are currently pending  Blood cultures negative so far  Cardiology consultation requested with his recent diagnosis of CHF as it might be contributing to his right-sided pleural effusion and volume overload     -     Small cell lung cancer (9/2022)  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 Desai from [facility]  - is s/p chemo and radiation therapy and per family he has been in remission; was last seen in oncology clinic in January  - CT abdomen 5/9 note \"1.7 x 1.7 cm cystic focus in the pancreatic head, possibly in intraductal papillary mucinous neoplasm, recommend continued attention to detail and surveillance imaging\"  - will consult [facility]     Hypertension  moderate aortic regurgitation  mild MR and TR  diastolic CHF  -he was diuresed briefly during hospitalization in March  -ECHO March 2025 noted EF 45 to 50%, grade 1 diastolic dysfunction  -currently appears euvolumic  -will monitor volume status closely  -is planned to follow up with cardiology as outpatient  -will resume PTA amlodipine, Coreg when verified by pharmacy     CKD stage III  chronic anemia  -creatinine 1.64; stable around baseline of 1.3 to 1.6  -hemoglobin 10.9  -monitor CBC and BMP intermittently     H/o abdominal aortic aneurysm  -I see a vascular surgery note from October 2023 which " "mentions \"presence of an infrarenal abdominal aortic aneurysm measuring 4.6 x 4.4 centimeters \"  -not sure if he has been followed up with vascular surgery  - CT abdomen 5/9/25 noted \"4.9 x 4.7 cm infrarenal abdominal aortic aneurysm, unchanged.\"  -would recommend follow-up with vascular surgery as outpatient             Diet: Combination Diet Regular Diet Adult    DVT Prophylaxis: Pneumatic Compression Devices and Ambulate every shift  Grande Catheter: Not present  Lines: None     Cardiac Monitoring: None  Code Status: No CPR- Do NOT Intubate      Clinically Significant Risk Factors Present on Admission          # Hyperchloremia: Highest Cl = 111 mmol/L in last 2 days, will monitor as appropriate          # Hypoalbuminemia: Lowest albumin = 2.9 g/dL at 5/10/2025  7:38 AM, will monitor as appropriate          # Anemia: based on hgb <11                  Social Drivers of Health    Tobacco Use: High Risk (12/12/2024)    Received from [facility]    Patient History     Smoking Tobacco Use: Some Days     Smokeless Tobacco Use: Never          Disposition Plan     Medically Ready for Discharge: Anticipated in 2-4 Days       Discussed with bedside RN patient and his daughter at bedside and IR physician with Dr. Hellen White MD  Hospitalist Service  [employer]  Securely message with clickTRUE (more info)  Text page via [employer] Paging/Directory   ______________________________________________________________________    Interval History   Chart reviewed.  Discussed with bedside RN patient and his daughter at bedside    Patient just returned from thoracentesis.  Received oral Dilaudid and Tylenol just now for pain and to help with the pain.  Denies shortness of breath currently.  Fever improving.  Had low-grade temp of 100 degrees overnight.  No other acute issues    Physical Exam   Vital Signs: Temp: 98.7  F (37.1  C) Temp src: Oral BP: 136/66 Pulse: 88   Resp: 20 SpO2: 94 % O2 Device: " None (Room air) Oxygen Delivery: 2 LPM  Weight: 133 lbs 13.11 oz    General Appearance: Alert awake, elderly male resting comfortably in bed, not in acute distress  Respiratory: Bibasilar crackles heard on auscultation otherwise clear to auscultation  Cardiovascular: Normal rate rhythm regular, no murmurs  GI: Soft, nontender nondistended bowel sounds positive  Skin: Warm and dry  Other: Calm and cooperative, no lower extremity edema noted    Medical Decision Making       52 MINUTES SPENT BY ME on the date of service doing chart review, history, exam, documentation & further activities per the note.      Data     I have personally reviewed the following data over the past 24 hrs:    6.6  \   10.4 (L)   / 257     142 111 (H) 39.0 (H) /  88   4.3 17 (L) 1.68 (H) \     ALT: 14 AST: 11 AP: 83 TBILI: 0.2   ALB: 2.9 (L) TOT PROTEIN: 6.1 (L) LIPASE: N/A     Trop: 46 (H) BNP: N/A     Procal: 0.97 (H) CRP: N/A Lactic Acid: N/A       Ferritin:  N/A % Retic:  N/A LDH:  96       Imaging results reviewed over the past 24 hrs:   Recent Results (from the past 24 hours)   US Abdomen Limited    Narrative    EXAM: US ABDOMEN LIMITED  LOCATION: [facility]  DATE: 5/9/2025    INDICATION: RUQ pain  COMPARISON: PET CT 1/15/2025  TECHNIQUE: Limited abdominal ultrasound.    FINDINGS:    GALLBLADDER: No definite gallstones. The gallbladder is decompressed.    BILE DUCTS: No biliary dilatation. The common duct measures 2 mm.    LIVER: Normal parenchyma with smooth contour. No focal mass. The portal vein is patent with flow in the normal direction.    RIGHT KIDNEY: No hydronephrosis. Benign cyst.    PANCREAS: The visualized portions are normal.    No ascites. Right pleural effusion noted.      Impression    IMPRESSION:  1.  Right pleural effusion.  2.  Otherwise negative right upper quadrant ultrasound.       CT Abdomen Pelvis w Contrast    Narrative    EXAM: CT ABDOMEN PELVIS W CONTRAST  LOCATION: [facility]  DATE: 5/9/2025    INDICATION: Right sided ab pain  COMPARISON: 5/9/2025  TECHNIQUE: CT scan of the abdomen and pelvis was performed following injection of IV contrast. Multiplanar reformats were obtained. Dose reduction techniques were used.  CONTRAST: 78mL Isovue 370    FINDINGS:   LOWER CHEST: Moderate right pleural effusion. Bilateral pleural calcifications. Mitral annular calcifications.    HEPATOBILIARY: Normal.    PANCREAS: Stable 1.7 x 1.7 cm cystic focus in the head of the pancreas.    SPLEEN: Normal.    ADRENAL GLANDS: Normal.    KIDNEYS/BLADDER: Mild bladder wall thickening. Simple right renal cyst. No hydroureteronephrosis.    BOWEL: Diverticulosis. No diverticulitis, colitis, or obstruction. No free air or fluid. Trace free fluid.    LYMPH NODES: Normal.    VASCULATURE: 4.9 x 4.7 x 8.2 cm fusiform infrarenal abdominal aortic aneurysm not significantly changed since most recent previous study.    PELVIC ORGANS: Moderately enlarged prostate.    MUSCULOSKELETAL: Moderate multilevel discogenic degenerative change.      Impression    IMPRESSION:   1.  4.9 x 4.7 cm infrarenal abdominal aortic aneurysm, unchanged.  2.  Small right pleural effusion.  3.  Stable pleural thickening with pleural calcifications.  4.  Mild bladder wall thickening, correlate to exclude cystitis.  5.  Moderately enlarged prostate.  6.  Diverticulosis. No diverticulitis, colitis, or obstruction.  7.  1.7 x 1.7 cm cystic focus in the pancreatic head, possibly in intraductal papillary mucinous neoplasm, recommend continued attention to detail and surveillance imaging    REFERENCE:  Revisions of international consensus [place] guidelines for the management of IPMN of the pancreas. Pancreatology 2017;17(5):738-753.    Largest cyst between 10-20 mm: CT or MRI/MRCP every 6 months for 1 year and then yearly for 2 years and then every 2 years if no change.      US Thoracentesis    Narrative    EXAM:   1. RIGHT  THORACENTESIS  2. ULTRASOUND GUIDANCE  LOCATION: [facility]  DATE: 5/10/2025    INDICATION: Pleural effusion.    PROCEDURE: Informed consent obtained. Time out performed. The chest was prepped and draped in sterile fashion. 10 mL of 1 % lidocaine was infused into the local soft tissues. Under direct ultrasound guidance, a 5 [place]olese catheter system was placed into   the pleural effusion.     0.55 liters of clear yellow fluid were removed and sent to lab, if requested.    Patient tolerated procedure well.    Ultrasound imaging was obtained and placed in the patient's permanent medical record.      Impression    IMPRESSION:  Status post right ultrasound-guided thoracentesis. 550 mL removed. I was with patient the entire drainage. Most of the fluid was removed, though patient could not tolerate additional fluid removal. Therefore, there is mild residual effusion due to   patient's significant pain before, during, and after the procedure.

## 2025-05-10 NOTE — CONSULTS
CARDIOLOGY CONSULTATION  May 10, 2025    REQUESTING PROVIDER:  Dr. Brown    REASON FOR CONSULTATION: elevated troponin.      HISTORY OF PRESENT ILLNESS:    89-year-old male with h/o HTN, COPD, CKD stage III, infrarenal AAA (4.9 x 4.7 cm, 5/9/2025), HFpEF, moderate AI, small cell lung cancer (9/2022, received chemo and radiation) who presented to the ER with right-sided chest pain cough and shortness of breath.    He lives in [place] and his son picked him up to bring him to the [facility] for a cardiology appointment with Dr. Brandon Ortiz yesterday for evaluation of valvular regurgitation and mild reduction in EF that were noted by echocardiography last March.. The patient complained of significant shortness of breath and right-sided abdominal and chest pain. He was brought to the ER instead.    In the ER he was diagnosed with right-sided pneumonia and a R pleural effusion. He underwent right thoracentesis with removal of 550 mL of yellow fluid.    Troponin level on admission was 52 with subsequent 46.    Today the patient is accompanied by his daughter. Until recently he had been quite active without exertional chest discomfort to suggest angina. He does not have history of CAD.    Social history: he lives alone in [place], [place]. His wife is in a nursing home. He still smokes, albeit lightly.      DIAGNOSTIC STUDIES:  Labs: troponin 52 - 46, sodium 142, potassium 4.3, creatinine 1.68, calcium 8.8, white count 6.6, hematocrit 32.6%, platelets 257K, procalcitonin 0.28  12-lead ECG: SR, mild nonspecific ST - T abnormality  Echocardiogram (3/2025, [facility]): EF 45 - 50%, severe LA enlargement, sclerotic aortic valve without stenosis, moderate AI.  CXR: RLL infiltrative and pleural effusion.      IMPRESSION:  Troponin elevation. Most likely type II myocardial injury related to the acute pulmonary process (pneumonia). However, he likely has significant CAD as he is older, has known vascular disease (AAA) and is an  active smoker.  Reported EF 45 - 50% recently.  Infrarenal AAA. He will need an appointment with vascular surgery at some point.  Community-acquired pneumonia with pleural effusion.    RECOMMENDATIONS:    Consider a baby aspirin and a statin.  Depending on his clinical course and evolution of respiratory symptoms in the next 2-3 days, he may be able to complete a Lexiscan nuclear stress test before discharge. Otherwise, this can be done as outpatient.  Cardiology is signing off. If his respiratory condition improves, please order above nuclear stress test before discharge.    I appreciate the opportunity to be part of this patient's care.  Please feel free to call me with any questions.    Moderate complexity medical decision-making.       Allyson Roman MD, Merged with [facility]        PHYSICAL EXAM:  Vitals: /66 (BP Location: Right arm)   Pulse 88   Temp 98.7  F (37.1  C) (Oral)   Resp 20   Wt 60.7 kg (133 lb 13.1 oz)   SpO2 94%     Intake/Output Summary (Last 24 hours) at 5/10/2025 1323  Last data filed at 5/10/2025 1231  Gross per 24 hour   Intake 480 ml   Output 650 ml   Net -170 ml     Vitals:    05/10/25 0553   Weight: 60.7 kg (133 lb 13.1 oz)     Constitutional: pleasant elderly male who is alert and oriented x3.  Pt appears comfortable, has no CP or respiratory distress.  Head: Normocephalic and atraumatic.   Skin:  Normal color and texture.  No rashes, lesions or eruptions.  Eyes:  no jaundice, EOMI.  ENT:  Supple, normal JVP, no apparent thyroid enlargement.  Lymph:  No lymphadenopathy   Chest/Lungs: significantly decreased breath sounds bilaterally, bilateral crackles.  Cardiac:  Regular rhythm, normal S1 and S2.  No murmur, rub or gallop.    GI:  Normal bowel sounds. Abdomen is soft, non-tender & not distended.  No rebound or guarding.    Extremities:  Radial pulses 2+ bilaterally.  DP and PT pulses palpable bilaterally.  No lower extremity edema is present.   Neurological:  CN 2-12 grossly intact.   Strength in UE is normal & symmetric.    Back:  No CVA tenderness.     REVIEW OF SYSTEMS:  A complete review of system was performed and was negative with the exception of what was described in the HPI section.     CURRENT MEDICATIONS:  Current Facility-Administered Medications   Medication Dose Route Frequency Provider Last Rate Last Admin    amLODIPine (NORVASC) tablet 5 mg  5 mg Oral Daily Imtiaz Brown MD   5 mg at 05/10/25 0907    azithromycin (ZITHROMAX) 250 mg in sodium chloride 0.9 % 250 mL intermittent infusion  250 mg Intravenous Q24H Imtiaz Brown MD        carvedilol (COREG) tablet 3.125 mg  3.125 mg Oral BID w/meals Imtiaz Brown MD   3.125 mg at 05/10/25 0906    cefTRIAXone (ROCEPHIN) 2 g vial to attach to  ml bag for ADULTS or NS 50 ml bag for PEDS  2 g Intravenous Q24H Imtiaz Brown MD        Lidocaine (LIDOCARE) 4 % Patch 1 patch  1 patch Transdermal Q24h Imtiaz Brown MD   1 patch at 25 2102    methocarbamol (ROBAXIN) tablet 500 mg  500 mg Oral 4x Daily Imtiaz Brown MD   500 mg at 05/10/25 1225    sodium chloride (PF) 0.9% PF flush 3 mL  3 mL Intracatheter Q8H [facility] Imtiaz Brown MD   3 mL at 05/10/25 0437    umeclidinium (INCRUSE ELLIPTA) 62.5 MCG/ACT inhaler 1 puff  1 puff Inhalation Daily with lunch Imtiaz Brown MD   1 puff at 05/10/25 1225       ALLERGIES   No Known Allergies    PAST MEDICAL HISTORY:  Past Medical History:   Diagnosis Date    Gout     Personal history of chemotherapy     Carboplatin/Etoposide (cycle 1: 2022 - 2022)    S/P radiation therapy     6,600 cGy to RUL, [place] completed on 2023 [facility]    Small cell lung cancer (H) 10/20/2022       PAST SURGICAL HISTORY:  Past Surgical History:   Procedure Laterality Date    APPENDECTOMY      ARTHROSCOPY SHOULDER Left     IR LUNG BIOPSY MEDIASTINUM RIGHT  10/20/2022     REPAIR CLEFT PALATE  1937    ROTATOR CUFF  REPAIR RT/LT Right     TONSILLECTOMY         FAMILY HISTORY:  Family History   Problem Relation Age of Onset    Cancer Father         Unknown type per patient    Prostate Cancer Brother        SOCIAL HISTORY:  Social History     Socioeconomic History    Marital status:    Tobacco Use    Smoking status: Every Day     Current packs/day: 1.00     Average packs/day: 1 pack/day for 65.0 years (65.0 ttl pk-yrs)     Types: Cigarettes    Smokeless tobacco: Never    Tobacco comments:     Patient reports currently smoking less than one pack per week - updated 12/6/2022   Substance and Sexual Activity    Alcohol use: Yes     Alcohol/week: 14.0 standard drinks of alcohol     Types: 14 Standard drinks or equivalent per week     Comment: Patient reports 2 Black Velvet + [employer] drinks per day    Drug use: Never     Social Drivers of Health     Financial Resource Strain: Low Risk  (5/10/2025)    Financial Resource Strain     Within the past 12 months, have you or your family members you live with been unable to get utilities (heat, electricity) when it was really needed?: No   Food Insecurity: Low Risk  (5/10/2025)    Food Insecurity     Within the past 12 months, did you worry that your food would run out before you got money to buy more?: No     Within the past 12 months, did the food you bought just not last and you didn t have money to get more?: No   Transportation Needs: Low Risk  (5/10/2025)    Transportation Needs     Within the past 12 months, has lack of transportation kept you from medical appointments, getting your medicines, non-medical meetings or appointments, work, or from getting things that you need?: No   Interpersonal Safety: Low Risk  (5/10/2025)    Interpersonal Safety     Do you feel physically and emotionally safe where you currently live?: Yes     Within the past 12 months, have you been hit, slapped, kicked or otherwise physically hurt by someone?: No     Within the past 12 months, have you been  humiliated or emotionally abused in other ways by your partner or ex-partner?: No   Housing Stability: Low Risk  (5/10/2025)    Housing Stability     Do you have housing? : Yes     Are you worried about losing your housing?: No         Recent Lab Results:  Recent Labs   Lab 05/10/25  0738 05/09/25  1146   WBC 6.6 6.2   HGB 10.4* 10.9*   * 99    256    142   POTASSIUM 4.3 4.0   CHLORIDE 111* 109*   CO2 17* 22   BUN 39.0* 45.2*   CR 1.68* 1.64*   ANIONGAP 14 11   ISA 8.8 8.9   GLC 88 120*   ALBUMIN 2.9* 3.3*   PROTTOTAL 6.1* 6.7   BILITOTAL 0.2 0.2   ALKPHOS 83 91   ALT 14 18   AST 11 17   LIPASE  --  35         Clinically Significant Risk Factors Present on Admission          # Hyperchloremia: Highest Cl = 111 mmol/L in last 2 days, will monitor as appropriate          # Hypoalbuminemia: Lowest albumin = 2.9 g/dL at 5/10/2025  7:38 AM, will monitor as appropriate          # Anemia: based on hgb <11

## 2025-05-11 LAB
ANION GAP SERPL CALCULATED.3IONS-SCNC: 13 MMOL/L (ref 7–15)
BUN SERPL-MCNC: 37.4 MG/DL (ref 8–23)
CALCIUM SERPL-MCNC: 8.8 MG/DL (ref 8.8–10.4)
CHLORIDE SERPL-SCNC: 108 MMOL/L (ref 98–107)
CHOLEST SERPL-MCNC: 122 MG/DL
CREAT SERPL-MCNC: 1.68 MG/DL (ref 0.67–1.17)
EGFRCR SERPLBLD CKD-EPI 2021: 39 ML/MIN/1.73M2
ERYTHROCYTE [DISTWIDTH] IN BLOOD BY AUTOMATED COUNT: 13 % (ref 10–15)
GLUCOSE SERPL-MCNC: 97 MG/DL (ref 70–99)
HCO3 SERPL-SCNC: 19 MMOL/L (ref 22–29)
HCT VFR BLD AUTO: 33.1 % (ref 40–53)
HDLC SERPL-MCNC: 43 MG/DL
HGB BLD-MCNC: 10.7 G/DL (ref 13.3–17.7)
LDLC SERPL CALC-MCNC: 60 MG/DL
MCH RBC QN AUTO: 32.5 PG (ref 26.5–33)
MCHC RBC AUTO-ENTMCNC: 32.3 G/DL (ref 31.5–36.5)
MCV RBC AUTO: 101 FL (ref 78–100)
NONHDLC SERPL-MCNC: 79 MG/DL
PLATELET # BLD AUTO: 286 10E3/UL (ref 150–450)
POTASSIUM SERPL-SCNC: 4.1 MMOL/L (ref 3.4–5.3)
RBC # BLD AUTO: 3.29 10E6/UL (ref 4.4–5.9)
SODIUM SERPL-SCNC: 140 MMOL/L (ref 135–145)
TRIGL SERPL-MCNC: 96 MG/DL
WBC # BLD AUTO: 4.1 10E3/UL (ref 4–11)

## 2025-05-11 PROCEDURE — 120N000001 HC R&B MED SURG/OB

## 2025-05-11 PROCEDURE — 250N000013 HC RX MED GY IP 250 OP 250 PS 637: Performed by: HOSPITALIST

## 2025-05-11 PROCEDURE — 250N000011 HC RX IP 250 OP 636: Performed by: HOSPITALIST

## 2025-05-11 PROCEDURE — 250N000013 HC RX MED GY IP 250 OP 250 PS 637: Performed by: INTERNAL MEDICINE

## 2025-05-11 PROCEDURE — 82565 ASSAY OF CREATININE: CPT | Performed by: INTERNAL MEDICINE

## 2025-05-11 PROCEDURE — 82465 ASSAY BLD/SERUM CHOLESTEROL: CPT | Performed by: INTERNAL MEDICINE

## 2025-05-11 PROCEDURE — 85027 COMPLETE CBC AUTOMATED: CPT | Performed by: INTERNAL MEDICINE

## 2025-05-11 PROCEDURE — 99232 SBSQ HOSP IP/OBS MODERATE 35: CPT | Performed by: INTERNAL MEDICINE

## 2025-05-11 PROCEDURE — 258N000003 HC RX IP 258 OP 636: Performed by: HOSPITALIST

## 2025-05-11 PROCEDURE — 36415 COLL VENOUS BLD VENIPUNCTURE: CPT | Performed by: INTERNAL MEDICINE

## 2025-05-11 RX ORDER — ATORVASTATIN CALCIUM 10 MG/1
10 TABLET, FILM COATED ORAL EVERY EVENING
Status: DISCONTINUED | OUTPATIENT
Start: 2025-05-11 | End: 2025-05-14 | Stop reason: HOSPADM

## 2025-05-11 RX ORDER — FUROSEMIDE 20 MG/1
10 TABLET ORAL DAILY
Status: DISCONTINUED | OUTPATIENT
Start: 2025-05-12 | End: 2025-05-14 | Stop reason: HOSPADM

## 2025-05-11 RX ORDER — FUROSEMIDE 20 MG/1
20 TABLET ORAL ONCE
Status: COMPLETED | OUTPATIENT
Start: 2025-05-11 | End: 2025-05-11

## 2025-05-11 RX ORDER — ACETAMINOPHEN 500 MG
1000 TABLET ORAL EVERY 8 HOURS
Status: DISCONTINUED | OUTPATIENT
Start: 2025-05-11 | End: 2025-05-14 | Stop reason: HOSPADM

## 2025-05-11 RX ORDER — METHOCARBAMOL 500 MG/1
500 TABLET, FILM COATED ORAL 3 TIMES DAILY
Status: DISCONTINUED | OUTPATIENT
Start: 2025-05-11 | End: 2025-05-14 | Stop reason: HOSPADM

## 2025-05-11 RX ADMIN — CARVEDILOL 3.12 MG: 3.12 TABLET, FILM COATED ORAL at 09:02

## 2025-05-11 RX ADMIN — ACETAMINOPHEN 1000 MG: 500 TABLET, FILM COATED ORAL at 19:50

## 2025-05-11 RX ADMIN — AMLODIPINE BESYLATE 5 MG: 5 TABLET ORAL at 09:02

## 2025-05-11 RX ADMIN — FUROSEMIDE 20 MG: 20 TABLET ORAL at 11:55

## 2025-05-11 RX ADMIN — METHOCARBAMOL 500 MG: 500 TABLET ORAL at 16:05

## 2025-05-11 RX ADMIN — AZITHROMYCIN MONOHYDRATE 250 MG: 500 INJECTION, POWDER, LYOPHILIZED, FOR SOLUTION INTRAVENOUS at 17:17

## 2025-05-11 RX ADMIN — OXYCODONE HYDROCHLORIDE 5 MG: 5 TABLET ORAL at 12:42

## 2025-05-11 RX ADMIN — METHOCARBAMOL 500 MG: 500 TABLET ORAL at 09:02

## 2025-05-11 RX ADMIN — ASPIRIN 81 MG: 81 TABLET, COATED ORAL at 09:02

## 2025-05-11 RX ADMIN — LIDOCAINE 1 PATCH: 4 PATCH TOPICAL at 19:50

## 2025-05-11 RX ADMIN — UMECLIDINIUM 1 PUFF: 62.5 AEROSOL, POWDER ORAL at 11:55

## 2025-05-11 RX ADMIN — ATORVASTATIN CALCIUM 10 MG: 10 TABLET, FILM COATED ORAL at 19:50

## 2025-05-11 RX ADMIN — ACETAMINOPHEN 650 MG: 325 TABLET, FILM COATED ORAL at 00:28

## 2025-05-11 RX ADMIN — ACETAMINOPHEN 1000 MG: 500 TABLET, FILM COATED ORAL at 11:55

## 2025-05-11 RX ADMIN — CEFTRIAXONE SODIUM 2 G: 2 INJECTION, POWDER, FOR SOLUTION INTRAMUSCULAR; INTRAVENOUS at 16:05

## 2025-05-11 RX ADMIN — CARVEDILOL 3.12 MG: 3.12 TABLET, FILM COATED ORAL at 17:17

## 2025-05-11 RX ADMIN — METHOCARBAMOL 500 MG: 500 TABLET ORAL at 21:10

## 2025-05-11 RX ADMIN — SENNOSIDES AND DOCUSATE SODIUM 2 TABLET: 50; 8.6 TABLET ORAL at 21:09

## 2025-05-11 ASSESSMENT — ACTIVITIES OF DAILY LIVING (ADL)
ADLS_ACUITY_SCORE: 27
ADLS_ACUITY_SCORE: 30
ADLS_ACUITY_SCORE: 27
ADLS_ACUITY_SCORE: 27
ADLS_ACUITY_SCORE: 30
ADLS_ACUITY_SCORE: 27
ADLS_ACUITY_SCORE: 30
ADLS_ACUITY_SCORE: 27
ADLS_ACUITY_SCORE: 30
ADLS_ACUITY_SCORE: 27
ADLS_ACUITY_SCORE: 30
ADLS_ACUITY_SCORE: 27
ADLS_ACUITY_SCORE: 30
ADLS_ACUITY_SCORE: 30
ADLS_ACUITY_SCORE: 27
ADLS_ACUITY_SCORE: 27
ADLS_ACUITY_SCORE: 30
ADLS_ACUITY_SCORE: 27

## 2025-05-11 NOTE — PLAN OF CARE
Goal Outcome Evaluation:      Plan of Care Reviewed With: patient      SUMMARY: Chest pain, SOB, Pneumonia    DATE & TIME: 05/10/2025 [id number]      Cognitive Concerns/ Orientation: A&O x4, [place]   BEHAVIOR & AGGRESSION TOOL COLOR: Green  ABNL VS/O2: VSS on 2L NC  MOBILITY: A x1 GB/W   PAIN MANAGMENT: C/o 27/10 right sided pleuritic pain - PRN Tylenol given x1,reused stronger medication. Lidocaine patch on Right side near rib cage   DIET: Regular   BOWEL/BLADDER: Continent of bowel and bladder; Urinal at bedside, No Bm this shift   ABNL LAB/BG: Creat 1.68, Trop 46  DRAIN/DEVICES: R PIV SL   TELEMETRY RHYTHM: NA  SKIN: Dry and flaky skin- abrasions to lower extremities   TESTS/PROCEDURES: Thoracentesis completed yesterday- 550 mL removed    D/C DATE: Pending     OTHER IMPORTANT INFO: hem/onc following. Cardio signed off.   Cultures from thoracentesis pending

## 2025-05-11 NOTE — PROGRESS NOTES
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     He has been having some shortness of breath for several weeks now. In February he was treated for pneumonia with a course of Ciprofloxacin and doxycycline. He then was admitted mid March with pneumonia (left basilar infiltrate) and was treated with a course of levofloxacin/Cefepime while hospitalized and then completed course of oral levofloxacin. During that hospitalization he was also noted with some diastolic CHF and some valvular abnormalities. He reports he was feeling better but then past couple of days started having worsening shortness of breath again with sharp pleuritic chest pain worse with any movement or deep inspiration. Reports subjective fever and chills. He was supposed to go to a cardiology clinic for follow-up but presented to ED with above symptoms.     Right-sided community acquired pneumonia  Right-sided pleural effusion s/p thoracentesis on 5/10/2025 with the 550 mL of fluid removed  right-sided chest pain, likely pleuritic/musculoskeletal secondary to above  acute hypoxic respiratory failure secondary to above  likely type II non-STEMI secondary to above  chronic right pleural effusion  COPD, not an exacerbation  -recent hospitalization for left sided pneumonia and initial presentation as noted above  -now presents with acute right chest pain with cough and shortness of breath, likely pleuritic  -afebrile here, normal WBC; troponin elevated without significant trend 52--- 46  -EKG normal sinus  "rhythm  -chest x-ray noted new opacities in right jonas-hilar region and right lower lobe suggested for pneumonia; small right pleural effusion  -ultrasound abdomen did nothing acute  -LFTs unremarkable  CT chest showed moderate right-sided pleural effusion underwent thoracentesis by IR.  550 mL of fluid was removed there was still some residual effusion could not be removed due to worsening pain as per the patient  Patient was given Dilaudid right after arrival to the floor and pain well-controlled currently  On IV ceftriaxone and Zithromax for antibiotics urine Legionella and strep antigens returned negative  Pleural fluid cultures negative so far and cytology are currently pending  Blood cultures negative so far  Cardiology consultation requested with his recent diagnosis of CHF as it might be contributing to his right-sided pleural effusion and volume overload.  They recommended starting patient on baby aspirin and statin  Also recommended checking a Lexiscan stress test prior to discharge  Lexiscan stress test ordered to be done tomorrow    Patient has diminished breath sounds in the right lung and crackles in the left lung so Lasix 20 mg p.o. one-time dose given.  Started on low-dose diuretic Lasix 10 mg p.o. daily starting tomorrow if creatinine remains stable  Complains of intermittent right chest pain pleuritic.  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 Desai from [place] [facility]  - is s/p chemo and radiation therapy and per family he has been in remission; was last seen in oncology clinic in January  - CT abdomen 5/9 note \"1.7 x 1.7 cm cystic focus in the pancreatic head, possibly in intraductal papillary mucinous neoplasm, recommend continued attention to detail and surveillance imaging\"  - [place] [facility] " "consulted.  Recommended outpatient follow-up  Hypertension  moderate aortic regurgitation  mild MR and TR  diastolic CHF  -he was diuresed briefly during hospitalization in March  -ECHO March 2025 noted EF 45 to 50%, grade 1 diastolic dysfunction  -currently appears euvolumic  -is planned to follow up with cardiology as outpatient  Resumed PTA Norvasc 5 mg p.o. daily, Coreg 3.125 mg p.o. twice daily  Started on baby aspirin and low-dose statin 10 mg of Lipitor daily Lexiscan stress test ordered as per cardiology recommendation to be done prior to discharge    Lasix 20 mg p.o. one-time dose and to start Lasix 10 mg p.o. daily  CKD stage III  chronic anemia  -creatinine 1.64; stable around baseline of 1.3 to 1.6  -hemoglobin 10.9  Monitor BMP     H/o abdominal aortic aneurysm  -I see a vascular surgery note from October 2023 which mentions \"presence of an infrarenal abdominal aortic aneurysm measuring 4.6 x 4.4 centimeters \"  -not sure if he has been followed up with vascular surgery  - CT abdomen 5/9/25 noted \"4.9 x 4.7 cm infrarenal abdominal aortic aneurysm, unchanged.\"  -would recommend follow-up with vascular surgery as outpatient             Diet: Combination Diet Regular Diet Adult; No Caffeine Diet    DVT Prophylaxis: Pneumatic Compression Devices and Ambulate every shift  Grande Catheter: Not present  Lines: None     Cardiac Monitoring: None  Code Status: No CPR- Do NOT Intubate      Clinically Significant Risk Factors          # Hyperchloremia: Highest Cl = 111 mmol/L in last 2 days, will monitor as appropriate          # Hypoalbuminemia: Lowest albumin = 2.9 g/dL at 5/10/2025  7:38 AM, will monitor as appropriate                             Social Drivers of Health    Tobacco Use: High Risk (12/12/2024)    Received from [employer]    Patient History     Smoking Tobacco Use: Some Days     Smokeless Tobacco Use: Never          Disposition Plan            Discussed with bedside RN, patient and his " daughter at bedside on 5/11/2025.  All their questions answered      Melani White MD  Hospitalist Service  [facility]  Securely message with [employer] (more info)  Text page via [employer] Paging/Directory   ______________________________________________________________________    Interval History   Chart reviewed.  Discussed with bedside RN patient and his daughter at bedside    Patient just returned from thoracentesis.  Received oral Dilaudid and Tylenol just now for pain and to help with the pain.  Denies shortness of breath currently.  Fever improving.  Had low-grade temp of 100 degrees overnight.  No other acute issues    Physical Exam   Vital Signs: Temp: 98.4  F (36.9  C) Temp src: Oral BP: 136/71 Pulse: 78   Resp: 18 SpO2: 93 % O2 Device: None (Room air) Oxygen Delivery: 2 LPM  Weight: 133 lbs 13.11 oz    General Appearance: Alert awake, elderly male resting comfortably in bed, not in acute distress  Respiratory: Diminished breath sounds in the right lung base, crackles heard in the left lung base  Cardiovascular: Normal rate rhythm regular, no murmurs  GI: Soft, nontender nondistended bowel sounds positive  Skin: Warm and dry  Other: Calm and cooperative, no lower extremity edema noted    Medical Decision Making       52 MINUTES SPENT BY ME on the date of service doing chart review, history, exam, documentation & further activities per the note.      Data     I have personally reviewed the following data over the past 24 hrs:    4.1  \   10.7 (L)   / 286     140 108 (H) 37.4 (H) /  97   4.1 19 (L) 1.68 (H) \       Imaging results reviewed over the past 24 hrs:   No results found for this or any previous visit (from the past 24 hours).

## 2025-05-11 NOTE — PLAN OF CARE
Goal Outcome Evaluation:      Plan of Care Reviewed With: patient    Overall Patient Progress: improvingOverall Patient Progress: improving     SUMMARY: Chest pain, SOB, Pneumonia    DATE & TIME: 05/10/2025 [id number]      Cognitive Concerns/ Orientation: A&O x4, [place]   BEHAVIOR & AGGRESSION TOOL COLOR: Green  ABNL VS/O2: VSS on 2L NC  MOBILITY: A x1 GB/W   PAIN MANAGMENT: C/o 10/10 right sided pleuritic pain - PRN Tylenol given x1, PRN IV Dilaudid given x1, 5mg Oxycodone given x1, Lidocaine patch on Right side near rib cage   DIET: Regular   BOWEL/BLADDER: Continent of bowel and bladder; Urinal at bedside, No Bm this shift   ABNL LAB/BG: Creat 1.68, Trop 46  DRAIN/DEVICES: R PIV SL; Intermittent abx   TELEMETRY RHYTHM: NA  SKIN: Dry and flaky skin- abrasions to lower extremities   TESTS/PROCEDURES: Thoracentesis completed this shift- 550 mL removed    D/C DATE: Pending     OTHER IMPORTANT INFO: hem/onc following. Cardio signed off.   Cultures from thoracentesis pending   Family at bedside throughout shift   Sputum sample still needs to be collected

## 2025-05-11 NOTE — PLAN OF CARE
Here for R sided chest pain. A&O x4. VSS, intermittently on 1L O2 with activity and sleep. Up w/ SBA, GB/W. Given oxycodone and tylenol for pain with improvement. Voiding in BR. Tolerating diet. Ambulating in halls today. Alarms on. Plan for lexiscan tomorrow.

## 2025-05-12 ENCOUNTER — APPOINTMENT (OUTPATIENT)
Dept: NUCLEAR MEDICINE | Facility: CLINIC | Age: OVER 89
DRG: 193 | End: 2025-05-12
Attending: INTERNAL MEDICINE
Payer: MEDICARE

## 2025-05-12 ENCOUNTER — APPOINTMENT (OUTPATIENT)
Dept: CT IMAGING | Facility: CLINIC | Age: OVER 89
DRG: 193 | End: 2025-05-12
Attending: INTERNAL MEDICINE
Payer: MEDICARE

## 2025-05-12 LAB
ANION GAP SERPL CALCULATED.3IONS-SCNC: 11 MMOL/L (ref 7–15)
BUN SERPL-MCNC: 36 MG/DL (ref 8–23)
CALCIUM SERPL-MCNC: 8.5 MG/DL (ref 8.8–10.4)
CHLORIDE SERPL-SCNC: 108 MMOL/L (ref 98–107)
CREAT SERPL-MCNC: 1.71 MG/DL (ref 0.67–1.17)
CV BLOOD PRESSURE: 52 MMHG
CV STRESS MAX HR HE: 99
EGFRCR SERPLBLD CKD-EPI 2021: 38 ML/MIN/1.73M2
GLUCOSE SERPL-MCNC: 106 MG/DL (ref 70–99)
HCO3 SERPL-SCNC: 19 MMOL/L (ref 22–29)
NUC STRESS EJECTION FRACTION: 50 %
PLATELET # BLD AUTO: 295 10E3/UL (ref 150–450)
POTASSIUM SERPL-SCNC: 4.3 MMOL/L (ref 3.4–5.3)
RADIOLOGIST FLAGS: NORMAL
RATE PRESSURE PRODUCT: NORMAL
SODIUM SERPL-SCNC: 138 MMOL/L (ref 135–145)
STRESS ECHO BASELINE DIASTOLIC HE: 77
STRESS ECHO BASELINE HR: 79 BPM
STRESS ECHO BASELINE SYSTOLIC BP: 165
STRESS ECHO CALCULATED PERCENT HR: 76 %
STRESS ECHO LAST STRESS DIASTOLIC BP: 71
STRESS ECHO LAST STRESS SYSTOLIC BP: 144
STRESS ECHO TARGET HR: 131

## 2025-05-12 PROCEDURE — 97116 GAIT TRAINING THERAPY: CPT | Mod: GP

## 2025-05-12 PROCEDURE — 78452 HT MUSCLE IMAGE SPECT MULT: CPT | Mod: 26 | Performed by: INTERNAL MEDICINE

## 2025-05-12 PROCEDURE — 999N000049 HC STATISTIC ECHO STRESS OR NM NPI

## 2025-05-12 PROCEDURE — 82565 ASSAY OF CREATININE: CPT | Performed by: INTERNAL MEDICINE

## 2025-05-12 PROCEDURE — 78452 HT MUSCLE IMAGE SPECT MULT: CPT

## 2025-05-12 PROCEDURE — 92610 EVALUATE SWALLOWING FUNCTION: CPT | Mod: GN | Performed by: SPEECH-LANGUAGE PATHOLOGIST

## 2025-05-12 PROCEDURE — 93016 CV STRESS TEST SUPVJ ONLY: CPT | Performed by: INTERNAL MEDICINE

## 2025-05-12 PROCEDURE — A9500 TC99M SESTAMIBI: HCPCS | Performed by: INTERNAL MEDICINE

## 2025-05-12 PROCEDURE — 258N000003 HC RX IP 258 OP 636: Performed by: HOSPITALIST

## 2025-05-12 PROCEDURE — 120N000001 HC R&B MED SURG/OB

## 2025-05-12 PROCEDURE — 71260 CT THORAX DX C+: CPT

## 2025-05-12 PROCEDURE — 250N000011 HC RX IP 250 OP 636: Performed by: INTERNAL MEDICINE

## 2025-05-12 PROCEDURE — 85049 AUTOMATED PLATELET COUNT: CPT | Performed by: HOSPITALIST

## 2025-05-12 PROCEDURE — 93017 CV STRESS TEST TRACING ONLY: CPT

## 2025-05-12 PROCEDURE — 97530 THERAPEUTIC ACTIVITIES: CPT | Mod: GP

## 2025-05-12 PROCEDURE — 93018 CV STRESS TEST I&R ONLY: CPT | Performed by: INTERNAL MEDICINE

## 2025-05-12 PROCEDURE — 250N000011 HC RX IP 250 OP 636: Mod: JZ | Performed by: INTERNAL MEDICINE

## 2025-05-12 PROCEDURE — 250N000011 HC RX IP 250 OP 636: Performed by: HOSPITALIST

## 2025-05-12 PROCEDURE — 97162 PT EVAL MOD COMPLEX 30 MIN: CPT | Mod: GP

## 2025-05-12 PROCEDURE — 36415 COLL VENOUS BLD VENIPUNCTURE: CPT | Performed by: HOSPITALIST

## 2025-05-12 PROCEDURE — 343N000001 HC RX 343 MED OP 636: Performed by: INTERNAL MEDICINE

## 2025-05-12 PROCEDURE — 250N000009 HC RX 250: Performed by: INTERNAL MEDICINE

## 2025-05-12 PROCEDURE — 250N000013 HC RX MED GY IP 250 OP 250 PS 637: Performed by: HOSPITALIST

## 2025-05-12 PROCEDURE — 250N000013 HC RX MED GY IP 250 OP 250 PS 637: Performed by: INTERNAL MEDICINE

## 2025-05-12 RX ORDER — AMINOPHYLLINE 25 MG/ML
50-100 INJECTION, SOLUTION INTRAVENOUS
Status: DISCONTINUED | OUTPATIENT
Start: 2025-05-12 | End: 2025-05-12

## 2025-05-12 RX ORDER — CAFFEINE CITRATE 20 MG/ML
60 SOLUTION INTRAVENOUS
Status: DISCONTINUED | OUTPATIENT
Start: 2025-05-12 | End: 2025-05-12

## 2025-05-12 RX ORDER — REGADENOSON 0.08 MG/ML
0.4 INJECTION, SOLUTION INTRAVENOUS ONCE
Status: COMPLETED | OUTPATIENT
Start: 2025-05-12 | End: 2025-05-12

## 2025-05-12 RX ORDER — NITROGLYCERIN 0.4 MG/1
0.4 TABLET SUBLINGUAL EVERY 5 MIN PRN
Status: DISCONTINUED | OUTPATIENT
Start: 2025-05-12 | End: 2025-05-12

## 2025-05-12 RX ORDER — ALBUTEROL SULFATE 90 UG/1
2 INHALANT RESPIRATORY (INHALATION) EVERY 5 MIN PRN
Status: DISCONTINUED | OUTPATIENT
Start: 2025-05-12 | End: 2025-05-12

## 2025-05-12 RX ORDER — IOPAMIDOL 755 MG/ML
66 INJECTION, SOLUTION INTRAVASCULAR ONCE
Status: COMPLETED | OUTPATIENT
Start: 2025-05-12 | End: 2025-05-12

## 2025-05-12 RX ORDER — SODIUM CHLORIDE 9 MG/ML
INJECTION, SOLUTION INTRAVENOUS CONTINUOUS PRN
Status: DISCONTINUED | OUTPATIENT
Start: 2025-05-12 | End: 2025-05-12

## 2025-05-12 RX ORDER — CAFFEINE 200 MG
200 TABLET ORAL
Status: DISCONTINUED | OUTPATIENT
Start: 2025-05-12 | End: 2025-05-12

## 2025-05-12 RX ORDER — LIDOCAINE 40 MG/G
CREAM TOPICAL
Status: DISCONTINUED | OUTPATIENT
Start: 2025-05-12 | End: 2025-05-12

## 2025-05-12 RX ADMIN — FUROSEMIDE 10 MG: 20 TABLET ORAL at 09:56

## 2025-05-12 RX ADMIN — ACETAMINOPHEN 1000 MG: 500 TABLET, FILM COATED ORAL at 12:26

## 2025-05-12 RX ADMIN — CEFTRIAXONE SODIUM 2 G: 2 INJECTION, POWDER, FOR SOLUTION INTRAMUSCULAR; INTRAVENOUS at 15:54

## 2025-05-12 RX ADMIN — METHOCARBAMOL 500 MG: 500 TABLET ORAL at 22:47

## 2025-05-12 RX ADMIN — AZITHROMYCIN MONOHYDRATE 250 MG: 500 INJECTION, POWDER, LYOPHILIZED, FOR SOLUTION INTRAVENOUS at 17:43

## 2025-05-12 RX ADMIN — CARVEDILOL 3.12 MG: 3.12 TABLET, FILM COATED ORAL at 17:44

## 2025-05-12 RX ADMIN — METHOCARBAMOL 500 MG: 500 TABLET ORAL at 15:54

## 2025-05-12 RX ADMIN — UMECLIDINIUM 1 PUFF: 62.5 AEROSOL, POWDER ORAL at 12:30

## 2025-05-12 RX ADMIN — ASPIRIN 81 MG: 81 TABLET, COATED ORAL at 09:55

## 2025-05-12 RX ADMIN — ACETAMINOPHEN 1000 MG: 500 TABLET, FILM COATED ORAL at 03:32

## 2025-05-12 RX ADMIN — CARVEDILOL 3.12 MG: 3.12 TABLET, FILM COATED ORAL at 09:55

## 2025-05-12 RX ADMIN — SODIUM CHLORIDE 60 ML: 9 INJECTION, SOLUTION INTRAVENOUS at 16:45

## 2025-05-12 RX ADMIN — TECHNETIUM TC 99M SESTAMIBI 9.4 MILLICURIE: 1 INJECTION INTRAVENOUS at 07:10

## 2025-05-12 RX ADMIN — REGADENOSON 0.4 MG: 0.08 INJECTION, SOLUTION INTRAVENOUS at 09:11

## 2025-05-12 RX ADMIN — AMLODIPINE BESYLATE 5 MG: 5 TABLET ORAL at 09:56

## 2025-05-12 RX ADMIN — IOPAMIDOL 66 ML: 755 INJECTION, SOLUTION INTRAVENOUS at 16:45

## 2025-05-12 RX ADMIN — TECHNETIUM TC 99M SESTAMIBI 31.6 MILLICURIE: 1 INJECTION INTRAVENOUS at 09:15

## 2025-05-12 RX ADMIN — METHOCARBAMOL 500 MG: 500 TABLET ORAL at 09:55

## 2025-05-12 RX ADMIN — LIDOCAINE 1 PATCH: 4 PATCH TOPICAL at 20:07

## 2025-05-12 RX ADMIN — OXYCODONE HYDROCHLORIDE 5 MG: 5 TABLET ORAL at 09:55

## 2025-05-12 RX ADMIN — AMINOPHYLLINE 50 MG: 25 INJECTION, SOLUTION INTRAVENOUS at 09:22

## 2025-05-12 RX ADMIN — ACETAMINOPHEN 1000 MG: 500 TABLET, FILM COATED ORAL at 20:06

## 2025-05-12 RX ADMIN — ATORVASTATIN CALCIUM 10 MG: 10 TABLET, FILM COATED ORAL at 20:07

## 2025-05-12 ASSESSMENT — ACTIVITIES OF DAILY LIVING (ADL)
ADLS_ACUITY_SCORE: 27
ADLS_ACUITY_SCORE: 36
ADLS_ACUITY_SCORE: 28
ADLS_ACUITY_SCORE: 36
ADLS_ACUITY_SCORE: 36
ADLS_ACUITY_SCORE: 27
ADLS_ACUITY_SCORE: 36
ADLS_ACUITY_SCORE: 28
ADLS_ACUITY_SCORE: 36
ADLS_ACUITY_SCORE: 36

## 2025-05-12 NOTE — PLAN OF CARE
Goal Outcome Evaluation:     SUMMARY: Chest pain, SOB, Pneumonia    DATE & TIME: 05/10/2025 to 5/11/25 [id number]      Cognitive Concerns/ Orientation: A&O x4, [place]   BEHAVIOR & AGGRESSION TOOL COLOR: Green  ABNL VS/O2: VSS  MOBILITY: A x1 GB/W   PAIN MANAGMENT: r side of the body, scheduled Tylenol  Lidocaine patch on Right side near rib cage   DIET: Regular   BOWEL/BLADDER: Continent of bowel and bladder; Urinal at bedside, No Bm this shift   ABNL LAB/BG: Creat 1.68, Trop 46  DRAIN/DEVICES: R PIV SL   TELEMETRY RHYTHM: NA  SKIN: Dry and flaky skin- abrasions to lower extremities   TESTS/PROCEDURES: NA  D/C DATE: Pending   OTHER IMPORTANT INFO: hem/onc following. Cardio signed off.   Cultures from thoracentesis pending

## 2025-05-12 NOTE — PROGRESS NOTES
Oncology Progress Note  [facility]  Date of Service : 05/12/2025    Primary Oncologist: Dr. 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  Pleuritic chest discomfort/ R-CAP treated twice for pneumonia recently   Hx RUL SCLC, currently LILLI, s/p chemoXRT   Mild macrocytic anemia  CKD III  Pleural effusion on the R side      Recommendations:     Reviewed differential for effusion would be infectious, malignant or idiopathic. We will follow up on the cytology from 5/10 thoracentesis.   Agree with ongoing supportive care and antibiotics  Outpatient referral to GI (MRCP +/-  EUS). Oncology involvement if proven malignancy.  Consider outpatient Retics, PBS, TSH, B12/MMA, SPEP/SFLC (if not done recently)  Follow-up with primary oncologist (as previously planned).      Patrick Dreyer, DO  [facility]   Office: [phone]         Interval History:   Still with some pleuritic chest pain. Has lost about 7-8 pounds in the past few months. Stress test this morning pending a read. Has had increased cough over the preceding several weeks.          Physical Exam:     Vitals:    05/10/25 0553   Weight: 60.7 kg (133 lb 13.1 oz)     Vital Signs with Ranges  Temp:  [97.6  F (36.4  C)-98.8  F (37.1  C)] 97.6  F (36.4  C)  Pulse:  [80-88] 88  Resp:  [16] 16  BP: (115-153)/(57-85) 153/85  SpO2:  [90 %-94 %] 91 %  General: No acute distress  HEENT: Membranes moist, no nasal discharge, no scleral icterus, atraumatic/normocephalic  Lungs: No audible wheezing, respirations unlabored, no cough  Cardiovascular: No evidence of pitting edema bilaterally  Abdomen: No evidence of abdominal distention  Extremities: No cyanosis or clubbing  Skin: Normal skin color, no rashes seen, no jaundice  Neurologic: Moving all extremities, no tremors, no evidence of focal deficits  Psych: Alert and oriented x3, calm    Data   Results for orders placed or performed  during the hospital encounter of 05/09/25 (from the past 24 hours)   NM Lexiscan stress test (nuc card)   Result Value Ref Range    Target      Baseline Systolic      Baseline Diastolic BP 77     Last Stress Systolic      Last Stress Diastolic BP 71     Baseline HR 79 bpm    Max HR  99     Max Predicted HR  76 %    Rate Pressure Product 14,256.0     BP 52 mmHg    Left Ventricular EF 50 %    Narrative      The nuclear stress test is abnormal.    There is a medium sized area of a moderate degree of transmural   infarction in the inferior segment(s) of the left ventricle. This appears   to be in the right coronary artery distribution.    Left ventricular function is low normal.    The left ventricular ejection fraction at rest is 52%.  The left   ventricular ejection fraction at stress is 50%.    There is no prior study for comparison.     Platelet count   Result Value Ref Range    Platelet Count 295 150 - 450 10e3/uL   Basic metabolic panel   Result Value Ref Range    Sodium 138 135 - 145 mmol/L    Potassium 4.3 3.4 - 5.3 mmol/L    Chloride 108 (H) 98 - 107 mmol/L    Carbon Dioxide (CO2) 19 (L) 22 - 29 mmol/L    Anion Gap 11 7 - 15 mmol/L    Urea Nitrogen 36.0 (H) 8.0 - 23.0 mg/dL    Creatinine 1.71 (H) 0.67 - 1.17 mg/dL    GFR Estimate 38 (L) >60 mL/min/1.73m2    Calcium 8.5 (L) 8.8 - 10.4 mg/dL    Glucose 106 (H) 70 - 99 mg/dL

## 2025-05-12 NOTE — PROGRESS NOTES
"Lexiscan Stress: Pt tolerated well. VSS. Pt reported 6/10 pain to right shoulder pain radiating to right chest prior to injection. After injection pt reported increase in baseline pain to \"27/10\" followed by a headache. 2 mg aminophylline administered per MAR due to prolonged pain and pt's discomfort. Pt's symptoms continuing to improve. Encouraged to have caffeine and something to eat.    0935 Pt transferred back to Rm 6637 per w/c. Detailed report called to Maryuri WILKS.     Marisol Rodriguez RN on 5/12/2025 at 9:36 AM        "

## 2025-05-12 NOTE — PLAN OF CARE
Goal Outcome Evaluation:      Plan of Care Reviewed With: patient    Overall Patient Progress: improvingOverall Patient Progress: improving    SUMMARY: Chest pain, SOB, Pneumonia    DATE & TIME: 5/12/25, [id number]      Cognitive Concerns/ Orientation: A&O x4, [place]   BEHAVIOR & AGGRESSION TOOL COLOR: Green  ABNL VS/O2: VSS  MOBILITY: A x1 GB/W   PAIN MANAGMENT: c/o R shoulder pain- PRN oxy given x1.  Scheduled robaxin and tylenol.   DIET: Regular   BOWEL/BLADDER: Continent of bowel and bladder; Urinal at bedside, No Bm this shift   ABNL LAB/BG: Creat 1.68, Trop 46  DRAIN/DEVICES: R PIV SL   TELEMETRY RHYTHM: NA  SKIN: Dry and flaky skin- abrasions to lower extremities   TESTS/PROCEDURES: stress test this morning- CT chest done for persistent pain in Right shoulder- pending.  D/C DATE: Pending   OTHER IMPORTANT INFO: hem/onc following. Cardio signed off. Cultures from thoracentesis pending

## 2025-05-12 NOTE — PROGRESS NOTES
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     He has been having some shortness of breath for several weeks now. In February he was treated for pneumonia with a course of Ciprofloxacin and doxycycline. He then was admitted mid March with pneumonia (left basilar infiltrate) and was treated with a course of levofloxacin/Cefepime while hospitalized and then completed course of oral levofloxacin. During that hospitalization he was also noted with some diastolic CHF and some valvular abnormalities. He reports he was feeling better but then past couple of days started having worsening shortness of breath again with sharp pleuritic chest pain worse with any movement or deep inspiration. Reports subjective fever and chills. He was supposed to go to a cardiology clinic for follow-up but presented to ED with above symptoms.     Right-sided community acquired pneumonia  Right-sided pleural effusion s/p thoracentesis on 5/10/2025 with the 550 mL of fluid removed  right-sided chest pain, likely pleuritic/musculoskeletal secondary to above  acute hypoxic respiratory failure secondary to above  likely type II non-STEMI secondary to above  chronic right pleural effusion  COPD, not an exacerbation  -recent hospitalization for left sided pneumonia and initial presentation as noted above  -now presents with acute right chest pain with cough and shortness of breath, likely pleuritic  -afebrile here, normal WBC; troponin elevated without significant trend 52--- 46  -EKG normal sinus  "rhythm  -chest x-ray noted new opacities in right jonas-hilar region and right lower lobe suggested for pneumonia; small right pleural effusion  -ultrasound abdomen did nothing acute  -LFTs unremarkable  CT chest showed moderate right-sided pleural effusion underwent thoracentesis by IR.  550 mL of fluid was removed there was still some residual effusion could not be removed due to worsening pain as per the patient  Patient was given Dilaudid right after arrival to the floor and pain well-controlled currently  On IV ceftriaxone and Zithromax.  urine Legionella and strep antigens returned negative  Pleural fluid cultures negative so far and cytology are currently pending  Blood cultures negative so far  Cardiology consultation requested with his recent diagnosis of CHF as it might be contributing to his right-sided pleural effusion and volume overload.  They recommended starting patient on baby aspirin and statin  Also recommended checking a Lexiscan stress test prior to discharge  Lexiscan stress test done 5/12 is abnormal, showing, \"there is a medium sized area of a moderate degree of transmural infarction in the inferior segment(s) of the left ventricle. This appears to be in the right coronary artery distribution.\"  Cardiology signed off 5/10.  Cardiology follow-up requested.    Patient has diminished breath sounds in the right lung and crackles in the left lung so Lasix 20 mg p.o. one-time dose given.  Started on low-dose diuretic Lasix 10 mg p.o. daily   Complains of intermittent right chest pain pleuritic.  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 Desai from [place] [facility]  - is s/p chemo and radiation therapy and per family he has been in remission; was last " "seen in oncology clinic in January  - CT abdomen 5/9 note \"1.7 x 1.7 cm cystic focus in the pancreatic head, possibly in intraductal papillary mucinous neoplasm, recommend continued attention to detail and surveillance imaging\"  - [place] [facility] consulted.  Recommended outpatient follow-up    Hypertension  moderate aortic regurgitation  mild MR and TR  diastolic CHF  -he was diuresed briefly during hospitalization in March  -ECHO March 2025 noted EF 45 to 50%, grade 1 diastolic dysfunction  -currently appears euvolumic  -is planned to follow up with cardiology as outpatient  Resumed PTA Norvasc 5 mg p.o. daily, Coreg 3.125 mg p.o. twice daily  Started on baby aspirin and low-dose statin 10 mg of Lipitor daily Lexiscan stress test ordered as per cardiology recommendation to be done prior to discharge  Lasix 20 mg p.o. one-time dose and to start Lasix 10 mg p.o. daily    CKD stage III  chronic anemia  -creatinine 1.64; stable around baseline of 1.3 to 1.6  -hemoglobin 10.9  Monitor BMP     H/o abdominal aortic aneurysm  -I see a vascular surgery note from October 2023 which mentions \"presence of an infrarenal abdominal aortic aneurysm measuring 4.6 x 4.4 centimeters \"  -not sure if he has been followed up with vascular surgery  - CT abdomen 5/9/25 noted \"4.9 x 4.7 cm infrarenal abdominal aortic aneurysm, unchanged.\"  -would recommend follow-up with vascular surgery as outpatient               Diet: Combination Diet Regular Diet Adult; No Caffeine Diet    DVT Prophylaxis: Pneumatic Compression Devices and Ambulate every shift  Grande Catheter: Not present  Lines: None     Cardiac Monitoring: None  Code Status: No CPR- Do NOT Intubate      Clinically Significant Risk Factors          # Hyperchloremia: Highest Cl = 108 mmol/L in last 2 days, will monitor as appropriate          # Hypoalbuminemia: Lowest albumin = 2.9 g/dL at 5/10/2025  7:38 AM, will monitor as appropriate             Social Drivers of Health  " "  Tobacco Use: High Risk (12/12/2024)    Received from [employer]    Patient History     Smoking Tobacco Use: Some Days     Smokeless Tobacco Use: Never          Disposition Plan         Micheline Kenny MD  Hospitalist Service  [facility]  Securely message with [employer] (more info)  Text page via [employer] Paging/Directory   ______________________________________________________________________    Interval History   \"The pain used to go all the way down my chest.\"  Patient has pain in the area of his right shoulder, pain flares with even light palpation of the skin in this region, pain is also worse if he coughs.  He has no GI complaints.  Daughters at the bedside, she asked multiple appropriate questions, answered to the best of my ability.    Physical Exam   Vital Signs: Temp: 98.8  F (37.1  C) Temp src: Oral BP: 121/57 Pulse: 80   Resp: 16 SpO2: (!) 90 % O2 Device: None (Room air) Oxygen Delivery: 1 LPM  Weight: 133 lbs 13.11 oz    General Appearance: Alert awake, elderly male resting comfortably in bed, not in acute distress  Respiratory: Tender to light palpation over the right anterior chest  Cardiovascular: Regular rate and rhythm  GI: Soft, nontender nondistended bowel sounds positive  Skin: Warm and dry  Other: Calm and cooperative, no lower extremity edema noted    Medical Decision Making       51 MINUTES SPENT BY ME on the date of service doing chart review, history, exam, documentation & further activities per the note.  Including discussion with patient, daughter, also bedside RN    Data     I have personally reviewed the following data over the past 24 hrs:    N/A  \   N/A   / N/A     N/A N/A N/A /  N/A   N/A N/A N/A \       Imaging results reviewed over the past 24 hrs:   No results found for this or any previous visit (from the past 24 hours).    "

## 2025-05-12 NOTE — PROGRESS NOTES
05/12/25 1400   Appointment Info   Signing Clinician's Name / Credentials (PT) Bettye Singh, PT, DPT   Living Environment   People in Home alone   Current Living Arrangements house   Home Accessibility stairs within home   Number of Stairs, Within Home, Primary ten   Stair Railings, Within Home, Primary railings on both sides of stairs   Transportation Anticipated car, drives self;family or friend will provide   Living Environment Comments Patient lives alone in a house in [place].  He has 3 children in [place] and 1 living in [place].  He plans to stay with one of his children in [place] for a day or two at discharge before family drives him back home.  Patient has to negotiate 2 + 8 steps down to his walk-in shower and laundry room.  He states there a rails at the stairs and around the house which were installed for his wife, who is now living in a nursing home.   Self-Care   Usual Activity Tolerance good   Current Activity Tolerance good   Regular Exercise No   Equipment Currently Used at Home none   Fall history within last six months no   Activity/Exercise/Self-Care Comment Patient is independent for I/ADLs and mobility.  He maintains a large garden.   General Information   Onset of Illness/Injury or Date of Surgery 05/09/25   Referring Physician Melani White MD   Patient/Family Therapy Goals Statement (PT) Patient would like less right shoulder pain.   Pertinent History of Current Problem (include personal factors and/or comorbidities that impact the POC) 89 year old male with PMH significant for hypertension, COPD (not on home oxygen), CKD stage III, h/o AAA, moderate aortic regurgitation, mild MR/TR, diastolic CHF, h/o 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   Existing Precautions/Restrictions fall   Cognition   Affect/Mental Status (Cognition) WFL   Orientation Status (Cognition)  oriented x 3   Follows Commands (Cognition) follows one-step commands;over 90% accuracy;increased processing time needed;delayed response/completion;repetition of directions required  ([place])   Pain Assessment   Patient Currently in Pain Yes, see Vital Sign flowsheet  (Describes right shouler pain as 24/10, stabbing pain)   Integumentary/Edema   Integumentary/Edema Comments Age-related skin changes, skin on LEs is dry with several small scratches   Posture    Posture Forward head position;Protracted shoulders   Range of Motion (ROM)   ROM Comment Limited AROM R shoulder due to pain, BLE WFL.   Strength (Manual Muscle Testing)   Strength (Manual Muscle Testing) Able to perform R SLR;Able to perform L SLR;strength is WFL   Bed Mobility   Comment, (Bed Mobility) Mod I supine > sit with HOB elevated and L bed rail.   Transfers   Comment, (Transfers) SBA sit > stand, bracing LEs posteriorly against bed with slight posterior loss of balance.   Gait/Stairs (Locomotion)   Comment, (Gait/Stairs) SBA 10 ft ambulation without AD.   Balance   Balance Comments High level balance impairment, denies falls in the past 6 months.   Sensory Examination   Sensory Perception patient reports no sensory changes   Clinical Impression   Criteria for Skilled Therapeutic Intervention Yes, treatment indicated   PT Diagnosis (PT) Impaired functional mobility   Influenced by the following impairments Severe right shoulder pain, RUE weakness, deconditioning, impaired balance, decreased activity tolerance   Functional limitations due to impairments Impaired independence with bed mobility, transfers, gait, and stairs   Clinical Presentation (PT Evaluation Complexity) evolving   Clinical Presentation Rationale Clinical judgement, PMH, social support   Clinical Decision Making (Complexity) moderate complexity   Planned Therapy Interventions (PT) balance training;bed mobility training;gait training;home exercise program;neuromuscular  re-education;patient/family education;strengthening;transfer training;progressive activity/exercise;cryotherapy;postural re-education;stair training   Risk & Benefits of therapy have been explained evaluation/treatment results reviewed;care plan/treatment goals reviewed;risks/benefits reviewed;participants voiced agreement with care plan;participants included;patient;daughter   PT Total Evaluation Time   PT Eval, Moderate Complexity Minutes (61864) 8   Physical Therapy Goals   PT Frequency Daily   PT Predicted Duration/Target Date for Goal Attainment 05/15/25   PT Goals Bed Mobility;Transfers;Gait;Stairs   PT: Bed Mobility Independent;Supine to/from sit;Rolling;Within precautions   PT: Transfers Independent;Sit to/from stand;Bed to/from chair   PT: Gait Independent;Greater than 200 feet   PT: Stairs Modified independent;10 stairs;Rail on both sides   Interventions   Interventions Quick Adds Therapeutic Activity;Gait Training   Therapeutic Activity   Therapeutic Activities: dynamic activities to improve functional performance Minutes (96256) 8   Symptoms Noted During/After Treatment Increased pain   Treatment Detail/Skilled Intervention Patient supine in bed on arrival, daughter returning to room at start of session.  Patient reporting Lexiscan completed this AM but stating plan for CT of R shoulder latera today due to continued, severe pain.  Rating right shoulder pain 24/10 at rest.  Patient instructed for ankle pumps, heel slides, and SLR to promote circulation.  Patient needing brief seated rest at EOB due to lightheadedness.  Lightheadedness resolves quickly.  Patient completes x5 repeated sit <> stands from bed with SBA, bracing LE posteriorly against mattress.  Patient able to self-correct mior posterior loss of balance with sit > stand.  Patient returning to sitting on EOB at end of session.  Discussed importance of early mobility t maintain strength and activity tolerance and patient encouraged to continue  ambulating in [place] with family.  Patient working with RN at end of session.   Gait Training   Gait Training Minutes (79506) 10   Symptoms Noted During/After Treatment (Gait Training) increased pain   Treatment Detail/Skilled Intervention Patient ambulates 350 ft without AD, SBA.  Patient intermittently reaching L hand up for R shoulder and wincing in pain.  Reaching out for rail in [place] with episodes of R shoulder pain.  He negotiates flight of stairs (10+) with B rails and SBA.  No overt loss of balance with ambulation or stairs.   PT Discharge Planning   PT Plan Repeated sit <> stands, amb with dynamic balance challenges, review stairs, standing LE exercises for strength   PT Discharge Recommendation (DC Rec) home with assist   PT Rationale for DC Rec Patient appears below his independent baseline, limited by severe right shoulder pain, but tolerates >300 ft ambulation and completes flight of stairs.  Anticipate discharge home with children assisting.  Patient lives in [place], has three children in [place] and one in [place].  Anticipate discharge home with family.   PT Brief overview of current status Ax1. Goals of therapy will be to address safe mobility and make recs for d/c to next level of care. Pt and RN will continue to follow all falls risk precautions as documented by RN staff while hospitalized.   PT Total Distance Amb During Session (feet) 360   Physical Therapy Time and Intention   Timed Code Treatment Minutes 18   Total Session Time (sum of timed and untimed services) 26

## 2025-05-12 NOTE — PROGRESS NOTES
05/12/25 1258   Appointment Info   Signing Clinician's Name / Credentials (SLP) Sabi Greer MS CCC SLP   General Information   Onset of Illness/Injury or Date of Surgery 05/09/25   Referring Physician Dr. White   Patient/Family Therapy Goal Statement (SLP) Patient reports no problems swallowing.   Pertinent History of Current Problem Per notes; Penelope Cisneros is a 89 year old male with PMH significant for hypertension, COPD (not on home oxygen), CKD stage III, h/o AAA, moderate aortic regurgitation, mild MR/TR, diastolic CHF, h/o 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     He has been having some shortness of breath for several weeks now. In February he was treated for pneumonia with a course of Ciprofloxacin and doxycycline. He then was admitted mid March with pneumonia (left basilar infiltrate) and was treated with a course of levofloxacin/Cefepime while hospitalized and then completed course of oral levofloxacin. During that hospitalization he was also noted with some diastolic CHF and some valvular abnormalities. He reports he was feeling better but then past couple of days started having worsening shortness of breath again with sharp pleuritic chest pain worse with any movement or deep inspiration. Reports subjective fever and chills. He was supposed to go to a cardiology clinic for follow-up but presented to ED with above symptoms.   General Observations Pleasnt and cooperative.   Type of Evaluation   Type of Evaluation Swallow Evaluation   Oral Motor   Oral Musculature generally intact   Structural Abnormalities present  (Cleft palate repair.)   Mucosal Quality adequate   Dentition (Oral Motor)   Dentition (Oral Motor) dental appliance/dentures   Dental Appliance/Denture (Oral Motor) upper   Facial Symmetry (Oral Motor)   Facial Symmetry (Oral Motor) WNL   Lip Function (Oral  Motor)   Lip Range of Motion (Oral Motor) WNL   Tongue Function (Oral Motor)   Tongue ROM (Oral Motor) WNL   Jaw Function (Oral Motor)   Jaw Function (Oral Motor) WNL   Cough/Swallow/Gag Reflex (Oral Motor)   Volitional Throat Clear/Cough (Oral Motor) impaired;reduced strength   Volitional Swallow (Oral Motor) mildly delayed   Vocal Quality/Secretion Management (Oral Motor)   Vocal Quality (Oral Motor) hoarse   General Swallowing Observations   Current Diet/Method of Nutritional Intake (General Swallowing Observations, NIS) regular diet;thin liquids (level 0)   Respiratory Support room air   Past History of Dysphagia No documented history found.   Swallowing Evaluation Clinical swallow evaluation   Clinical Swallow Evaluation   Feeding Assistance no assistance needed   Clinical Swallow Evaluation Textures Trialed thin liquids;pureed;solid foods   Clinical Swallow Eval: Thin Liquid Texture Trial   Mode of Presentation, Thin Liquids cup;straw;self-fed   Volume of Liquid or Food Presented 5 oz of water   Oral Phase of Swallow premature pharyngeal entry   Pharyngeal Phase of Swallow impaired;reduction in laryngeal movement   Diagnostic Statement Suspect premature entry but tolerated 3 consecutive swallows without immediate or delayed sx of aspiration.   Clinical Swallow Evaluation: Puree Solid Texture Trial   Mode of Presentation, Puree spoon;self-fed   Volume of Puree Presented 2 oz of apple sauce   Oral Phase, Puree WFL   Pharyngeal Phase, Puree impaired;wet vocal quality after swallow   Diagnostic Statement No sx of aspiration.   Clinical Swallow Evaluation: Solid Food Texture Trial   Mode of Presentation spoon;self-fed   Volume Presented peaches and cracker   Oral Phase WFL   Pharyngeal Phase impaired;wet vocal quality after swallow   Diagnostic Statement Intermittent wet vocal quality that he is able to clear with a throat clear swallow.   Swallowing Recommendations   Diet Consistency Recommendations regular  diet;thin liquids (level 0)   Supervision Level for Intake distant supervision needed   Mode of Delivery Recommendations bolus size, small;slow rate of intake   Postural Recommendations none   Swallowing Maneuver Recommendations alternate food and liquid intake;extra swallow   Monitoring/Assistance Required (Eating/Swallowing) monitor for cough or change in vocal quality with intake   Recommended Feeding/Eating Techniques (Swallow Eval) maintain upright posture during/after eating for 30 minutes;maintain upright sitting position for eating   Medication Administration Recommendations, Swallowing (SLP) Per his preference   Instrumental Assessment Recommendations VFSS (videofluoroscopic swallowing study)   Comment, Swallowing Recommendations Patient and daughter declined at this time. Will consider if he has another bout of pneumonia.   General Therapy Interventions   Planned Therapy Interventions Dysphagia Treatment   Dysphagia treatment Instruction of safe swallow strategies   Clinical Impression   Criteria for Skilled Therapeutic Interventions Met (SLP Eval) Yes, treatment indicated   SLP Diagnosis Mild oral and pharyngeal dysphagia   Risks & Benefits of therapy have been explained evaluation/treatment results reviewed;care plan/treatment goals reviewed;risks/benefits reviewed;current/potential barriers reviewed;participants voiced agreement with care plan;participants included;patient;daughter   Clinical Impression Comments Clinical/bedside swallow completed per MD orders. Patient presents with mild oral and pharyngeal dysphagia at bedside characterized by premature entry of thin liquids without immediate or delayed sx of aspiration on consecutive swallows. Mastication was sufficient for soft solid and solids with good oral clearance. Noted intermittent wet vocal quality that he was able to clear with a cued cough/throat clear. Evaluation was shorter due to having IV needing to be placed.   Recommend: 1. Continue  on a regular diet and thin liquids. 2. Upright, small bites/sips and alternate liquids/solids. Throat clear/cough if vocal changes noted. 3. SLP will follow up for diet tolerance and strategies.   SLP Total Evaluation Time   Eval: oral/pharyngeal swallow function, clinical swallow Minutes (50101) 17   SLP Goals   Therapy Frequency (SLP Eval) 3 times/week   SLP Predicted Duration/Target Date for Goal Attainment 05/26/25   SLP Goals Swallow   SLP: Safely tolerate diet without signs/symptoms of aspiration Regular diet;Thin liquids;With use of swallow precautions;With assistance/supervision   SLP Discharge Planning   SLP Plan meal f/u with strategies reg/thin   SLP Discharge Recommendation home   SLP Rationale for DC Rec Anticipate swallow goals to be met in the hospital.   SLP Brief overview of current status  Mild oral and pharyngeal dysphagia monitor for sx of aspiration.   SLP Time and Intention   Total Session Time (sum of timed and untimed services) 17

## 2025-05-13 ENCOUNTER — APPOINTMENT (OUTPATIENT)
Dept: CARDIOLOGY | Facility: CLINIC | Age: OVER 89
DRG: 193 | End: 2025-05-13
Attending: INTERNAL MEDICINE
Payer: MEDICARE

## 2025-05-13 ENCOUNTER — TELEPHONE (OUTPATIENT)
Dept: CARDIOLOGY | Facility: CLINIC | Age: OVER 89
End: 2025-05-13

## 2025-05-13 LAB
ANION GAP SERPL CALCULATED.3IONS-SCNC: 12 MMOL/L (ref 7–15)
BUN SERPL-MCNC: 32 MG/DL (ref 8–23)
CALCIUM SERPL-MCNC: 8.7 MG/DL (ref 8.8–10.4)
CHLORIDE SERPL-SCNC: 108 MMOL/L (ref 98–107)
CREAT SERPL-MCNC: 1.59 MG/DL (ref 0.67–1.17)
EGFRCR SERPLBLD CKD-EPI 2021: 41 ML/MIN/1.73M2
ERYTHROCYTE [DISTWIDTH] IN BLOOD BY AUTOMATED COUNT: 12.7 % (ref 10–15)
GLUCOSE SERPL-MCNC: 125 MG/DL (ref 70–99)
HCO3 SERPL-SCNC: 19 MMOL/L (ref 22–29)
HCT VFR BLD AUTO: 32.1 % (ref 40–53)
HGB BLD-MCNC: 10 G/DL (ref 13.3–17.7)
LVEF ECHO: NORMAL
MCH RBC QN AUTO: 31.8 PG (ref 26.5–33)
MCHC RBC AUTO-ENTMCNC: 31.2 G/DL (ref 31.5–36.5)
MCV RBC AUTO: 102 FL (ref 78–100)
PATH REPORT.COMMENTS IMP SPEC: NORMAL
PATH REPORT.FINAL DX SPEC: NORMAL
PATH REPORT.GROSS SPEC: NORMAL
PATH REPORT.MICROSCOPIC SPEC OTHER STN: NORMAL
PATH REPORT.RELEVANT HX SPEC: NORMAL
PLATELET # BLD AUTO: 322 10E3/UL (ref 150–450)
POTASSIUM SERPL-SCNC: 4.2 MMOL/L (ref 3.4–5.3)
PROCALCITONIN SERPL IA-MCNC: 0.33 NG/ML
RBC # BLD AUTO: 3.14 10E6/UL (ref 4.4–5.9)
SODIUM SERPL-SCNC: 139 MMOL/L (ref 135–145)
WBC # BLD AUTO: 4.1 10E3/UL (ref 4–11)

## 2025-05-13 PROCEDURE — 250N000011 HC RX IP 250 OP 636: Mod: JZ | Performed by: HOSPITALIST

## 2025-05-13 PROCEDURE — 250N000013 HC RX MED GY IP 250 OP 250 PS 637: Performed by: INTERNAL MEDICINE

## 2025-05-13 PROCEDURE — 88112 CYTOPATH CELL ENHANCE TECH: CPT | Mod: 26 | Performed by: PATHOLOGY

## 2025-05-13 PROCEDURE — 99222 1ST HOSP IP/OBS MODERATE 55: CPT | Performed by: INTERNAL MEDICINE

## 2025-05-13 PROCEDURE — 36415 COLL VENOUS BLD VENIPUNCTURE: CPT | Performed by: INTERNAL MEDICINE

## 2025-05-13 PROCEDURE — 97116 GAIT TRAINING THERAPY: CPT | Mod: GP

## 2025-05-13 PROCEDURE — 258N000003 HC RX IP 258 OP 636: Performed by: HOSPITALIST

## 2025-05-13 PROCEDURE — 93306 TTE W/DOPPLER COMPLETE: CPT | Mod: 26 | Performed by: INTERNAL MEDICINE

## 2025-05-13 PROCEDURE — 88305 TISSUE EXAM BY PATHOLOGIST: CPT | Mod: 26 | Performed by: PATHOLOGY

## 2025-05-13 PROCEDURE — 85027 COMPLETE CBC AUTOMATED: CPT | Performed by: INTERNAL MEDICINE

## 2025-05-13 PROCEDURE — 250N000013 HC RX MED GY IP 250 OP 250 PS 637: Performed by: HOSPITALIST

## 2025-05-13 PROCEDURE — 120N000001 HC R&B MED SURG/OB

## 2025-05-13 PROCEDURE — 80048 BASIC METABOLIC PNL TOTAL CA: CPT | Performed by: INTERNAL MEDICINE

## 2025-05-13 PROCEDURE — 999N000208 ECHOCARDIOGRAM COMPLETE

## 2025-05-13 PROCEDURE — 84145 PROCALCITONIN (PCT): CPT | Performed by: INTERNAL MEDICINE

## 2025-05-13 PROCEDURE — 255N000002 HC RX 255 OP 636: Performed by: INTERNAL MEDICINE

## 2025-05-13 RX ADMIN — ACETAMINOPHEN 1000 MG: 500 TABLET, FILM COATED ORAL at 11:40

## 2025-05-13 RX ADMIN — ASPIRIN 81 MG: 81 TABLET, COATED ORAL at 08:22

## 2025-05-13 RX ADMIN — ATORVASTATIN CALCIUM 10 MG: 10 TABLET, FILM COATED ORAL at 19:46

## 2025-05-13 RX ADMIN — AZITHROMYCIN MONOHYDRATE 250 MG: 500 INJECTION, POWDER, LYOPHILIZED, FOR SOLUTION INTRAVENOUS at 17:18

## 2025-05-13 RX ADMIN — UMECLIDINIUM 1 PUFF: 62.5 AEROSOL, POWDER ORAL at 12:39

## 2025-05-13 RX ADMIN — CARVEDILOL 3.12 MG: 3.12 TABLET, FILM COATED ORAL at 17:17

## 2025-05-13 RX ADMIN — METHOCARBAMOL 500 MG: 500 TABLET ORAL at 08:22

## 2025-05-13 RX ADMIN — PERFLUTREN 10 ML: 6.52 INJECTION, SUSPENSION INTRAVENOUS at 14:41

## 2025-05-13 RX ADMIN — ACETAMINOPHEN 1000 MG: 500 TABLET, FILM COATED ORAL at 19:46

## 2025-05-13 RX ADMIN — ACETAMINOPHEN 1000 MG: 500 TABLET, FILM COATED ORAL at 03:54

## 2025-05-13 RX ADMIN — CEFTRIAXONE SODIUM 2 G: 2 INJECTION, POWDER, FOR SOLUTION INTRAMUSCULAR; INTRAVENOUS at 16:04

## 2025-05-13 RX ADMIN — LIDOCAINE 1 PATCH: 4 PATCH TOPICAL at 19:47

## 2025-05-13 RX ADMIN — METHOCARBAMOL 500 MG: 500 TABLET ORAL at 16:04

## 2025-05-13 RX ADMIN — FUROSEMIDE 10 MG: 20 TABLET ORAL at 08:22

## 2025-05-13 RX ADMIN — AMLODIPINE BESYLATE 5 MG: 5 TABLET ORAL at 08:22

## 2025-05-13 RX ADMIN — CARVEDILOL 3.12 MG: 3.12 TABLET, FILM COATED ORAL at 08:22

## 2025-05-13 ASSESSMENT — ACTIVITIES OF DAILY LIVING (ADL)
ADLS_ACUITY_SCORE: 36
ADLS_ACUITY_SCORE: 36
ADLS_ACUITY_SCORE: 42
ADLS_ACUITY_SCORE: 36
ADLS_ACUITY_SCORE: 41
ADLS_ACUITY_SCORE: 36
ADLS_ACUITY_SCORE: 41
ADLS_ACUITY_SCORE: 36
ADLS_ACUITY_SCORE: 36
ADLS_ACUITY_SCORE: 41
ADLS_ACUITY_SCORE: 36
ADLS_ACUITY_SCORE: 36
ADLS_ACUITY_SCORE: 42
ADLS_ACUITY_SCORE: 36
ADLS_ACUITY_SCORE: 41
ADLS_ACUITY_SCORE: 36
ADLS_ACUITY_SCORE: 36
ADLS_ACUITY_SCORE: 42
ADLS_ACUITY_SCORE: 42

## 2025-05-13 NOTE — PLAN OF CARE
Goal Outcome Evaluation:       SUMMARY: Chest pain, SOB, Pneumonia    DATE & TIME: 05/12/2025 -05/13/2025 [id number]      Cognitive Concerns/ Orientation: A & O x4; [place]   BEHAVIOR & AGGRESSION TOOL COLOR: Green  ABNL VS/O2: VSS on RA  MOBILITY: SBA with gait belt and walker, fall risk  PAIN MANAGMENT: Complaints of right shoulder pain, managed with scheduled Tylenol, Lidocaine patch, and Robaxin   DIET: Regular  BOWEL/BLADDER: Continent of bowel and bladder; No BM this shift   ABNL LAB/BG: Creat 1.71, Hgb 10.7  DRAIN/DEVICES: L PIV SL   TELEMETRY RHYTHM: NA  SKIN: Dry/flaky skin. Scattered bruising. Abrasions to bilateral lower extremities.  TESTS/PROCEDURES: None this shift  D/C DATE: Discharge pending progress, PT recommending home with family assist    OTHER IMPORTANT INFO: Hem/Onc following, Cardio signed off. Lidocaine patch on R shoulder

## 2025-05-13 NOTE — TELEPHONE ENCOUNTER
[facility] Call Center    Phone Message    May a detailed message be left on voicemail: yes     Reason for Call: Other: Patient currently hospitalized at [facility], due to be discharged tomorrow. Patient needs hospital follow-up appt, but they want to be seen up at [place], as patient's sons, who live in [facility], will be taking patient to his appointments. He originally had an appt scheduled at [place] for CHF, referral & records ffrom Dr. Helms at [facility] in [place], [place], on 5/28, but daughter cancelled that. Please review & reach out to patient for scheduling.       Action Taken: Other: Cardiology    Travel Screening: Not Applicable    Thank you!  [facility]

## 2025-05-13 NOTE — PLAN OF CARE
Goal Outcome Evaluation:DATE & TIME: -05/13/2025 [id number]   Cognitive Concerns/ Orientation: A & O x4; [place]   BEHAVIOR & AGGRESSION TOOL COLOR: Green  ABNL VS/O2: VSS on RA  MOBILITY: SBA with gait belt and walker, fall risk  PAIN MANAGMENT: Complaints of right shoulder pain, managed with scheduled Tylenol, Robaxin   DIET: Regular  BOWEL/BLADDER: Continent of bowel and bladder;   ABNL LAB/BG: Creat 1.59, Hgb 10.7  DRAIN/DEVICES: L  PIV SL   TELEMETRY RHYTHM: NA  SKIN: Dry/flaky skin. Scattered bruising. Abrasions to bilateral lower extremities.  TESTS/PROCEDURES:ECHO  D/C DATE: possible discharge tomorrow,  home with family assist.  OTHER IMPORTANT INFO: Hem/Onc,Cardio signed off.

## 2025-05-13 NOTE — CONSULTS
Inpatient Cardiology Consultation.   [facility]  Date of Admission: 5/9/2025  Date of Consult: May 13, 2025      DIAGNOSES/ASSESSMENT:     Abnormal stress test suggestive of infarct without ischemia with no patient-reported symptoms of angina and no known history of coronary artery disease.   Non-cardiac pleuritic right-sided chest pain in the context of pneumonia and pleural effusion requiring thoracentesis.   Recurrent community-acquired pneumonia.   Stable 5.1 cm infrarenal abdominal aortic aneurysm under surveillance.  History of lung cancer status post chemotherapy and radiation in 2022.  Stage IIIb chronic kidney disease.  Creatinine 1.6-1.7.  Patient resides in [place].    PLAN:     Given non-cardiac chest pain, no exercise limitation prior to admission, no reversible ischemia only infarct on stress test and minimal troponin elevation without ischemic EKG changes, and current admission for recurrent pneumonia, I do not think an inpatient coronary angiogram is indicated.  Transthoracic echocardiogram to assess LVEF and wall motion abnormalities.    Manage medically unless the echocardiogram shows significant abnormalities.  Once he has recovered from his pneumonia and is stable, we will follow-up in the outpatient cardiology clinic in 2 to 3 months for repeat clinical evaluation.  Detailed discussion with patient in the presence of his son and daughter.  They are comfortable with this plan.      Jacqueline Manuel MD, MD [facility]  Cardiology    Total time today 62 minutes.      CODE STATUS:  No CPR- Do NOT Intubate      REASON FOR CONSULT:  Abnormal cardiovascular stress test.    HISTORY OF PRESENT ILLNESS:  Penelope Cisneros is a 89 year old male with a history of hypertension, COPD, stage 3b CKD with a creatinine level of 1.6-1.7, and an infrarenal abdominal aortic aneurysm under surveillance measuring 4.9 x 4.7 cm as of May 2025. He was diagnosed with small cell lung cancer in 2022 and  has undergone chemotherapy and radiation. The patient, who resides in [place], has been admitted for recurrent right lung pneumonia, marking his second admission for this condition.     He was diagnosed in the emergency room with right-sided pneumonia and a right pleural effusion, and underwent right-sided thoracentesis on 5/9/2025, with  removal of 550 cc of serous fluid. He experienced right-sided pleuritic chest pain but has no known history of coronary artery disease. Prior to the pneumonia episodes starting in March 2025, he was physically active with no concerns for angina. He still smokes lightly. A nuclear stress test performed yesterday revealed a medium-sized moderate degree of transmural infarct in the inferior segment without reversible ischemia. His LVEF was noted to be 52%, consistent with a recent echocardiogram from [facility] showing an LVEF of about 50%.  Troponin minimally elevated and flat in a nonmyocardial injury pattern at 52-46.  EKG shows sinus rhythm with fixed nonspecific ST changes.  Creatinine is 1.6, he is chronically anemic with a hemoglobin of 10.0, no bleeding. A chest CT from yesterday shows new pneumonia in the right mid and lower lobes with atelectasis, a small to moderate right pleural effusion, and an unchanged 5.1 cm infrarenal abdominal aortic aneurysm.     Physical Exam: On examination, the patient has regular heart sounds and appears comfortable at rest. No murmur or pericardial friction rub is noted. The pulse is 82 BPM, BP is 135/64 mmHg, and oxygen saturation is 94% on room air. There is no carotid bruit, with mildly decreased breath sounds on the right lung base.  There is no lower extremity edema, and the patient is alert and oriented.     REVIEW OF SYSTEMS:  A comprehensive 10 point review of systems was completed and the pertinent positives are documented in history of present illness.    FAMILY HISTORY:  Family History   Problem Relation Age of Onset    Cancer  Father         Unknown type per patient    Prostate Cancer Brother        MEDICATIONS:  Prior to Admission Medications   Prescriptions Last Dose Informant Patient Reported? Taking?   Multiple Vitamin (MULTIVITAMIN PO) Past Week  Yes Yes   Sig: Take 1 tablet by mouth daily.   Multiple Vitamins-Minerals (OCUVITE PRESERVISION PO) Past Week  Yes Yes   Sig: Take 1 tablet by mouth daily.   amLODIPine (NORVASC) 5 MG tablet 5/9/2025 Morning  Yes Yes   Sig: Take 5 mg by mouth daily   carvedilol (COREG) 3.125 MG tablet 5/9/2025 Morning  Yes Yes   Sig: Take 3.125 mg by mouth 2 times daily (with meals).   cholecalciferol (VITAMIN D3) 25 mcg (1000 units) capsule Past Week  Yes Yes   Sig: Take 1 capsule by mouth daily.   ciprofloxacin (CIPRO) 500 MG tablet   Yes Yes   Sig: Take 500 mg by mouth daily as needed (diverticulitis).   ipratropium - albuterol 0.5 mg/2.5 mg/3 mL (DUONEB) 0.5-2.5 (3) MG/3ML neb solution   Yes Yes   Sig: Take 1 vial by nebulization every 6 hours as needed for shortness of breath, wheezing or cough.   predniSONE (DELTASONE) 20 MG tablet   Yes Yes   Sig: Take 20 mg by mouth 2 times daily as needed (gout flare).   umeclidinium (INCRUSE ELLIPTA) 62.5 MCG/ACT inhaler 5/8/2025  Yes Yes   Sig: Inhale 1 puff into the lungs daily (with lunch).   vitamin C (ASCORBIC ACID) 500 MG tablet Past Week  Yes Yes   Sig: Take 500 mg by mouth daily.      Facility-Administered Medications: None       HOME MEDICATIONS:  Prior to Admission Medications   Prescriptions Last Dose Informant Patient Reported? Taking?   Multiple Vitamin (MULTIVITAMIN PO) Past Week  Yes Yes   Sig: Take 1 tablet by mouth daily.   Multiple Vitamins-Minerals (OCUVITE PRESERVISION PO) Past Week  Yes Yes   Sig: Take 1 tablet by mouth daily.   amLODIPine (NORVASC) 5 MG tablet 5/9/2025 Morning  Yes Yes   Sig: Take 5 mg by mouth daily   carvedilol (COREG) 3.125 MG tablet 5/9/2025 Morning  Yes Yes   Sig: Take 3.125 mg by mouth 2 times daily (with meals).    cholecalciferol (VITAMIN D3) 25 mcg (1000 units) capsule Past Week  Yes Yes   Sig: Take 1 capsule by mouth daily.   ciprofloxacin (CIPRO) 500 MG tablet   Yes Yes   Sig: Take 500 mg by mouth daily as needed (diverticulitis).   ipratropium - albuterol 0.5 mg/2.5 mg/3 mL (DUONEB) 0.5-2.5 (3) MG/3ML neb solution   Yes Yes   Sig: Take 1 vial by nebulization every 6 hours as needed for shortness of breath, wheezing or cough.   predniSONE (DELTASONE) 20 MG tablet   Yes Yes   Sig: Take 20 mg by mouth 2 times daily as needed (gout flare).   umeclidinium (INCRUSE ELLIPTA) 62.5 MCG/ACT inhaler 2025  Yes Yes   Sig: Inhale 1 puff into the lungs daily (with lunch).   vitamin C (ASCORBIC ACID) 500 MG tablet Past Week  Yes Yes   Sig: Take 500 mg by mouth daily.      Facility-Administered Medications: None       ALLERGIES:  No Known Allergies    PAST MEDICAL HISTORY:  Past Medical History:   Diagnosis Date    Gout     Personal history of chemotherapy     Carboplatin/Etoposide (cycle 1: 2022 - 2022)    S/P radiation therapy     6,600 cGy to RUL, [place] completed on 2023 - [facility]    Small cell lung cancer (H) 10/20/2022       PAST SURGICAL HISTORY:  Past Surgical History:   Procedure Laterality Date    APPENDECTOMY      ARTHROSCOPY SHOULDER Left     IR LUNG BIOPSY MEDIASTINUM RIGHT  10/20/2022     REPAIR CLEFT PALATE  1937    ROTATOR CUFF REPAIR RT/LT Right     TONSILLECTOMY         SOCIAL HISTORY:   Penelope Cisneros  reports that he has been smoking cigarettes. He has a 65 pack-year smoking history. He has never used smokeless tobacco. He reports current alcohol use of about 14.0 standard drinks of alcohol per week. He reports that he does not use drugs.      PHYSICAL EXAMINATION:  Temp: 98.2  F (36.8  C) Temp src: Oral BP: 135/64 Pulse: 82   Resp: 18 SpO2: 94 % O2 Device: None (Room air)     0700 -  0659  In: 720 [P.O.:720]  Out: 651 [Urine:651]  Net: 69  Vitals:     05/10/25 0553   Weight: 60.7 kg (133 lb 13.1 oz)       Clinically Significant Risk Factors          # Hyperchloremia: Highest Cl = 108 mmol/L in last 2 days, will monitor as appropriate          # Hypoalbuminemia: Lowest albumin = 2.9 g/dL at 5/10/2025  7:38 AM, will monitor as appropriate                        Pneumonia      Jacqueline Manuel MD, MD    This note was completed in part using dictation via the Dragon voice recognition software. Some word and grammatical errors may occur and must be interpreted in the appropriate clinical context. If there are any questions pertaining to this issue, please contact me for further clarification.

## 2025-05-13 NOTE — PLAN OF CARE
"Physical Therapy Discharge Summary    Reason for therapy discharge:    All goals and outcomes met, no further needs identified.    Progress towards therapy goal(s). See goals on Care Plan in Epic electronic health record for goal details.  Goals met    Therapy recommendation(s):    Per most recent PT note: \"Pt is near baseline mobility status, mild balance deficits noted during dynamic gait challenges on this date, recommending having SEC or walking stick with uneven surfaces such as out in yard, but pt and daugther reporting he has hand rails \"all over\" and assistive device not needed at this time. IP PT goals met. Pt safe for d/c home with children assisting. Patient lives in [place], has three children in [place] and one in [place]. Anticipate discharge home with family.\"       "

## 2025-05-13 NOTE — PROGRESS NOTES
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  Pleuritic chest discomfort/ R-CAP treated twice for pneumonia recently. Currently on ceftriaxone and zithromax.   Hx RUL SCLC, currently LILLI, s/p chemoXRT   Mild macrocytic anemia  CKD III  Pleural effusion on the R side, Cytology negative from 5/10/25 thoracentesis.  + for acute and chronic inflammation  Lexiscan stress test done 5/12 is abnormal, showing, \"there is a medium sized area of a moderate degree of transmural infarction in the inferior segment(s) of the left ventricle. This appears to be in the right coronary artery distribution.\"..   - cardiology following     Recommendations:     Reviewed differential for effusion would be infectious, malignant or idiopathic. Fortunately, cytology is negative.   CT chest 5/12/25: Comparison 1/15/25 PET scan;  New airspace opacity within the right middle and right lower lobes, favoring a combination of pneumonia and atelectasis, though superimposed malignancy or posttreatment change is difficult to exclude. Chest CT follow-up is recommended in 4-6 weeks.   Agree with ongoing supportive care and antibiotics  Outpatient referral to GI (MRCP +/-  EUS). Oncology involvement if proven malignancy.  Consider outpatient Retics, PBS, TSH, B12/MMA, SPEP/SFLC (if not done recently)  Follow-up with primary oncologist. We will repeat chest imaging and arrange a provider visit in one month. We will call pt to schedule.           CHET Parrish, AOJADYNP  Nurse Practitioner  [facility]  [phone]          Interval History:     Hgb 10              Review of Systems:     The 5 point Review of Systems is negative other than noted in the HPI                Medications:   Scheduled Medications  Current Facility-Administered Medications   Medication Dose Route Frequency Provider " Last Rate Last Admin    acetaminophen (TYLENOL) tablet 1,000 mg  1,000 mg Oral Q8H Melani White MD   1,000 mg at 05/13/25 0354    amLODIPine (NORVASC) tablet 5 mg  5 mg Oral Daily Imtiaz Brown MD   5 mg at 05/13/25 0822    aspirin EC tablet 81 mg  81 mg Oral Daily Melani White MD   81 mg at 05/13/25 0822    atorvastatin (LIPITOR) tablet 10 mg  10 mg Oral QPM Melani White MD   10 mg at 05/12/25 2007    azithromycin (ZITHROMAX) 250 mg in sodium chloride 0.9 % 250 mL intermittent infusion  250 mg Intravenous Q24H Imtiaz Brown  mL/hr at 05/12/25 1743 250 mg at 05/12/25 1743    carvedilol (COREG) tablet 3.125 mg  3.125 mg Oral BID w/meals Imtiaz Brown MD   3.125 mg at 05/13/25 0822    cefTRIAXone (ROCEPHIN) 2 g vial to attach to  ml bag for ADULTS or NS 50 ml bag for PEDS  2 g Intravenous Q24H Imtiaz Brown  mL/hr at 05/12/25 1554 2 g at 05/12/25 1554    furosemide (LASIX) half-tab 10 mg  10 mg Oral Daily Melani White MD   10 mg at 05/13/25 0822    Lidocaine (LIDOCARE) 4 % Patch 1 patch  1 patch Transdermal Q24h Imtiaz Brown MD   1 patch at 05/12/25 2007    methocarbamol (ROBAXIN) tablet 500 mg  500 mg Oral TID Melani White MD   500 mg at 05/13/25 0822    sodium chloride (PF) 0.9% PF flush 3 mL  3 mL Intravenous Q8H She Santacruz MD   3 mL at 05/13/25 0823    sodium chloride (PF) 0.9% PF flush 3 mL  3 mL Intracatheter Q8H [facility] Imtiaz Brown MD   3 mL at 05/12/25 1401    umeclidinium (INCRUSE ELLIPTA) 62.5 MCG/ACT inhaler 1 puff  1 puff Inhalation Daily with lunch Imtiaz Brown MD   1 puff at 05/12/25 1230     PRN Medications  Current Facility-Administered Medications   Medication Dose Route Frequency Provider Last Rate Last Admin    acetaminophen (TYLENOL) tablet 650 mg  650 mg Oral Q4H PRN Imtiaz Brown MD   650 mg at 05/11/25 0028    Or    acetaminophen (TYLENOL) Suppository 650 mg  650 mg Rectal Q4H PRN Imtiaz Brown  MD Deandre        calcium carbonate (TUMS) chewable tablet 1,000 mg  1,000 mg Oral 4x Daily PRN Imtiaz Brown MD        guaiFENesin-dextromethorphan (ROBITUSSIN DM) 100-10 MG/5ML syrup 10 mL  10 mL Oral Q4H PRN Imtiaz Brown MD        hydrALAZINE (APRESOLINE) injection 10 mg  10 mg Intravenous Q4H PRN Imtiaz Brown MD        HYDROmorphone (DILAUDID) injection 0.2 mg  0.2 mg Intravenous Q2H PRN Imtiaz Brown MD   0.2 mg at 05/10/25 1107    HYDROmorphone (DILAUDID) injection 0.4 mg  0.4 mg Intravenous Q2H PRN Imtiaz Brown MD        ipratropium - albuterol 0.5 mg/2.5 mg/3 mL (DUONEB) neb solution 3 mL  3 mL Nebulization Q4H PRN Imtiaz Brown MD        lidocaine (LMX4) cream   Topical Q1H PRN Imtiaz Brown MD        lidocaine 1 % 0.1-1 mL  0.1-1 mL Other Q1H PRN Imtiaz Brown MD        naloxone (NARCAN) injection 0.2 mg  0.2 mg Intravenous Q2 Min PRN Imtiaz Brwon MD        Or    naloxone (NARCAN) injection 0.4 mg  0.4 mg Intravenous Q2 Min PRN Imtiaz Brown MD        Or    naloxone (NARCAN) injection 0.2 mg  0.2 mg Intramuscular Q2 Min PRN Imtiaz Brown MD        Or    naloxone (NARCAN) injection 0.4 mg  0.4 mg Intramuscular Q2 Min PRImtiaz Marquis MD        ondansetron (ZOFRAN ODT) ODT tab 4 mg  4 mg Oral Q6H PRN Imtiaz Brown MD        Or    ondansetron (ZOFRAN) injection 4 mg  4 mg Intravenous Q6H PRN Imtiaz Brown MD        oxyCODONE (ROXICODONE) tablet 5 mg  5 mg Oral Q4H PRN Krzysztofuniyar, Imtiaz Carrera, MD   5 mg at 05/12/25 0955    oxyCODONE IR (ROXICODONE) half-tab 2.5 mg  2.5 mg Oral Q4H Imtiaz Bell MD senna-docusate (SENOKOT-S/PERICOLACE) 8.6-50 MG per tablet 1 tablet  1 tablet Oral BID Imtiaz Bell MD        Or    monesrrat-docusate (SENOKOT-S/PERICOLACE) 8.6-50 MG per tablet 2 tablet  2 tablet Oral BID Imtiaz Bell MD   2 tablet at 05/11/25 9249    sodium  "chloride (PF) 0.9% PF flush 3 mL  3 mL Intravenous Q1H PRN She Santacruz MD        sodium chloride (PF) 0.9% PF flush 3 mL  3 mL Intracatheter q1 min prn Imtiaz Brown MD   3 mL at 05/10/25 1635                  Physical Exam:   Vitals were reviewed  Blood pressure 135/64, pulse 82, temperature 98.2  F (36.8  C), temperature source Oral, resp. rate 18, height 1.702 m (5' 7.01\"), weight 60.7 kg (133 lb 13.1 oz), SpO2 94%.  Wt Readings from Last 4 Encounters:   05/10/25 60.7 kg (133 lb 13.1 oz)   01/18/23 70.1 kg (154 lb 9.6 oz)   01/11/23 68.1 kg (150 lb 3.2 oz)   01/04/23 68.2 kg (150 lb 6.4 oz)       I/O last 3 completed shifts:  In: -   Out: 1 [Urine:1]                 Data:   All laboratory data and imaging studies reviewed.    CMP  Recent Labs   Lab 05/13/25  0826 05/12/25  1338 05/11/25  0906 05/10/25  0738 05/09/25  1146    138 140 142 142   POTASSIUM 4.2 4.3 4.1 4.3 4.0   CHLORIDE 108* 108* 108* 111* 109*   CO2 19* 19* 19* 17* 22   ANIONGAP 12 11 13 14 11   * 106* 97 88 120*   BUN 32.0* 36.0* 37.4* 39.0* 45.2*   CR 1.59* 1.71* 1.68* 1.68* 1.64*   GFRESTIMATED 41* 38* 39* 39* 40*   ISA 8.7* 8.5* 8.8 8.8 8.9   PROTTOTAL  --   --   --  6.1* 6.7   ALBUMIN  --   --   --  2.9* 3.3*   BILITOTAL  --   --   --  0.2 0.2   ALKPHOS  --   --   --  83 91   AST  --   --   --  11 17   ALT  --   --   --  14 18     CBC  Recent Labs   Lab 05/13/25  0826 05/12/25  1338 05/11/25  0906 05/10/25  0738 05/09/25  1146   WBC 4.1  --  4.1 6.6 6.2   RBC 3.14*  --  3.29* 3.24* 3.38*   HGB 10.0*  --  10.7* 10.4* 10.9*   HCT 32.1*  --  33.1* 32.6* 33.4*   *  --  101* 101* 99   MCH 31.8  --  32.5 32.1 32.2   MCHC 31.2*  --  32.3 31.9 32.6   RDW 12.7  --  13.0 12.9 12.9    295 286 257 256     INRNo lab results found in last 7 days.        Thiago ROSENBERG, RAHEELP  Nurse Practitioner  [facility]  [phone]      "

## 2025-05-13 NOTE — PROGRESS NOTES
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     He has been having some shortness of breath for several weeks now. In February he was treated for pneumonia with a course of Ciprofloxacin and doxycycline. He then was admitted mid March with pneumonia (left basilar infiltrate) and was treated with a course of levofloxacin/Cefepime while hospitalized and then completed course of oral levofloxacin. During that hospitalization he was also noted with some diastolic CHF and some valvular abnormalities. He reports he was feeling better but then past couple of days started having worsening shortness of breath again with sharp pleuritic chest pain worse with any movement or deep inspiration. Reports subjective fever and chills. He was supposed to go to a cardiology clinic for follow-up but presented to ED with above symptoms.     Right-sided community acquired pneumonia  Right-sided pleural effusion s/p thoracentesis on 5/10/2025 with the 550 mL of fluid removed  right-sided chest pain, likely pleuritic/musculoskeletal secondary to above  acute hypoxic respiratory failure secondary to above  likely type II non-STEMI secondary to above  chronic right pleural effusion  COPD, not an exacerbation  -recent hospitalization for left sided pneumonia and initial presentation as noted above  -now presents with acute right chest pain with cough and shortness of breath, likely pleuritic  -afebrile here, normal WBC; troponin elevated without significant trend 52--- 46  -EKG normal sinus  "rhythm  chest x-ray noted new opacities in right jonas-hilar region and right lower lobe suggested for pneumonia; small right pleural effusion  CT chest showed moderate right-sided pleural effusion underwent thoracentesis by IR.  550 mL of fluid was removed there was still some residual effusion could not be removed due to worsening pain as per the patient  On IV ceftriaxone and Zithromax.  urine Legionella and strep antigens returned negative  Pleural fluid cultures negative so far and cytology is negative for malignancy  Blood cultures negative so far  Cardiology consultation requested with his recent diagnosis of CHF as it might be contributing to his right-sided pleural effusion and volume overload.  They recommended starting patient on baby aspirin and statin  Also recommended checking a Lexiscan stress test prior to discharge  Lexiscan stress test done 5/12 is abnormal, showing, \"there is a medium sized area of a moderate degree of transmural infarction in the inferior segment(s) of the left ventricle. This appears to be in the right coronary artery distribution.\"  Cardiology signed off 5/10.    Cardiology follow-up requested, I appreciate Dr. Manuel's evaluation and recommendations  She recommended checking echo to assess LVEF and wall motion abnormalities  She continues, \"manage medically unless the echocardiogram shows significant abnormalities.  We will follow-up in the outpatient cardiology clinic in 2 to 3 months.\"  low-dose diuretic Lasix 10 mg p.o. daily   Complains of intermittent right chest pain pleuritic.  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  Recommended outpatient follow-up    Hypertension  moderate aortic regurgitation  mild MR and TR  diastolic CHF  he was diuresed briefly during hospitalization in March  ECHO March 2025 noted EF 45 to 50%, grade 1 diastolic dysfunction  currently appears euvolumic  Resumed PTA Norvasc 5 mg p.o. daily, Coreg 3.125 mg p.o. twice daily  Started on baby aspirin and low-dose statin 10 mg of Lipitor daily Lexiscan stress test ordered as per cardiology recommendation to be done prior to discharge  Lasix 20 mg p.o. one-time dose and to start Lasix 10 mg p.o. daily    CKD stage III  chronic anemia  creatinine 1.64; stable around baseline of 1.3 to 1.6  hemoglobin 10.9  Monitor BMP     H/o abdominal aortic aneurysm  vascular surgery note from October 2023 which mentions \"presence of an infrarenal abdominal aortic aneurysm measuring 4.6 x 4.4 centimeters \"  CT abdomen 5/9/25 noted \"4.9 x 4.7 cm infrarenal abdominal aortic aneurysm, unchanged.\"  recommend follow-up with vascular surgery as outpatient        Diet: Combination Diet Regular Diet Adult    DVT Prophylaxis: Pneumatic Compression Devices and Ambulate every shift  Grande Catheter: Not present  Lines: None     Cardiac Monitoring: None  Code Status: No CPR- Do NOT Intubate      Clinically Significant Risk Factors          # Hyperchloremia: Highest Cl = 108 mmol/L in last 2 days, will monitor as appropriate          # Hypoalbuminemia: Lowest albumin = 2.9 g/dL at 5/10/2025  7:38 AM, will monitor as appropriate             Social Drivers of Health    Tobacco Use: High Risk (12/12/2024)    Received from [employer]    Patient History     Smoking Tobacco Use: Some Days     Smokeless Tobacco Use: Never          Disposition Plan    " "anticipated tomorrow      Micheline Kenny MD  Hospitalist Service  [facility]  Securely message with "[employer] (more info)  Text page via [employer] Paging/Directory   ______________________________________________________________________    Interval History   \"That pain is more in my shoulder today.\"  Patient says he still has pain with certain movements or with cough, but pain has improved.  He is anxious for discharge.  Daughter is at the bedside, she asked several appropriate questions.  We discussed chest CT finding cardiology recommendation for echo.    Physical Exam   Vital Signs: Temp: 98.1  F (36.7  C) Temp src: Oral BP: (!) 151/70 Pulse: 75   Resp: 15 SpO2: 95 % O2 Device: None (Room air)    Weight: 133 lbs 13.11 oz    General Appearance: Elderly man who is awake and alert, he looks comfortable  Respiratory: Decreased breath sounds at right lung base, no crackles  Cardiovascular: Regular rate and rhythm  GI: Soft, nontender nondistended bowel sounds positive  Skin: Warm and dry  Other: Mood is pleasant, he seems a bit impatient today    Medical Decision Making       51 MINUTES SPENT BY ME on the date of service doing chart review, history, exam, documentation & further activities per the note.  Including discussion with patient daughter, also Oncology NP    Data     I have personally reviewed the following data over the past 24 hrs:    4.1  \   10.0 (L)   / 322     139 108 (H) 32.0 (H) /  125 (H)   4.2 19 (L) 1.59 (H) \     Procal: 0.33 CRP: N/A Lactic Acid: N/A         Imaging results reviewed over the past 24 hrs:   Recent Results (from the past 24 hours)   Echocardiogram Complete   Result Value    LVEF  55%    Narrative    [id number]  [id number]  [id number]  [id number]^FABIAN^[place]^ANA MARIA     [facility]  Echocardiography Laboratory  [address] [place] [id number]     Name: GRECIA HECTOR  MRN: [id number]  : 1935  Study Date: 2025 02:14 PM  Age: 89 " yrs  Gender: Male  Patient Location: [facility]  Reason For Study: Abn Stress Test, Abn Stress Test  Ordering Physician: IVANIA PERALTA  Referring Physician: Jens Helms  Performed By: Dian Daniel     BSA: 1.7 m2  Height: 67 in  Weight: 133 lb  HR: 59  BP: 135/64 mmHg  ______________________________________________________________________________  Procedure  Echocardiogram with two-dimensional, color and spectral Doppler. Definity (NDC  #32225-090) given intravenously.  ______________________________________________________________________________  Interpretation Summary     Left ventricular systolic function is normal. The visual ejection fraction is  estimated at 55%.  No regional wall motion abnormalities noted.  The right ventricle is normal in size and function.  The left atrium is moderately dilated. Right atrial size is normal.  Moderate-severe aortic stenosis with ELI of 1.0 cm2 and mean gradient of 20  mmhg. Trace AI.  ______________________________________________________________________________  Left Ventricle  The left ventricle is normal in size. There is borderline concentric left  ventricular hypertrophy. Grade I or early diastolic dysfunction. Left  ventricular systolic function is normal. The visual ejection fraction is  estimated at 55%. No regional wall motion abnormalities noted.     Right Ventricle  The right ventricle is normal in size and function.     Atria  The left atrium is moderately dilated. Right atrial size is normal.     Mitral Valve  There is moderate mitral annular calcification. The mitral valve leaflets are  mildly thickened. There is mild (1+) mitral regurgitation.     Tricuspid Valve  Normal tricuspid valve.     Aortic Valve  The aortic valve is not well visualized. There is trace aortic regurgitation.  Severe valvular aortic stenosis. The mean AoV pressure gradient is 20.2 mmHg.  The calculated aortic valve are is 0.99 cm^2.     Pulmonic Valve  The pulmonic valve is not  well visualized.     Vessels  Aortic root dilatation is present. Ascending aorta dilatation is present. IVC  diameter <2.1 cm collapsing >50% with sniff suggests a normal RA pressure of 3  mmHg.     Pericardium  There is no pericardial effusion.     Rhythm  Sinus rhythm was noted.  ______________________________________________________________________________  MMode/2D Measurements & Calculations  IVSd: 0.98 cm     LVIDd: 4.8 cm  LVIDs: 4.1 cm  LVPWd: 1.1 cm  FS: 14.5 %  LV mass(C)d: 176.7 grams  LV mass(C)dI: 103.9 grams/m2  Ao root diam: 3.8 cm  asc Aorta Diam: 3.8 cm  LVOT diam: 2.4 cm  LVOT area: 4.7 cm2  Ao root diam index Ht(cm/m): 2.2  Ao root diam index BSA (cm/m2): 2.2  Asc Ao diam index BSA (cm/m2): 2.2  Asc Ao diam index Ht(cm/m): 2.2  EF Biplane: 45.0 %  LA Volume (BP): 83.1 ml     LA Volume Index (BP): 48.9 ml/m2  RV Base: 3.1 cm  RWT: 0.45  TAPSE: 2.2 cm     Doppler Measurements & Calculations  MV E max kofi: 62.9 cm/sec  MV A max kofi: 103.0 cm/sec  MV E/A: 0.61  LV IVRT: 0.04 sec  MV max P.1 mmHg  MV mean P.2 mmHg  MV V2 VTI: 33.7 cm  MVA(VTI): 2.6 cm2  MV dec time: 0.27 sec  Ao V2 max: 257.5 cm/sec  Ao max P.0 mmHg  Ao V2 mean: 195.9 cm/sec  Ao mean P.2 mmHg  Ao V2 VTI: 90.0 cm  ELI(I,D): 0.99 cm2  ELI(V,D): 1.4 cm2  AI P1/2t: 474.5 msec  LV V1 max P.5 mmHg  LV V1 max: 79.3 cm/sec  LV V1 VTI: 18.9 cm  SV(LVOT): 88.9 ml  SI(LVOT): 52.3 ml/m2  PA acc time: 0.10 sec     AV Kofi Ratio (DI): 0.31  ELI Index (cm2/m2): 0.58  E/E' av.0  Lateral E/e': 7.2  Medial E/e': 10.7  RV S Kofi: 14.4 cm/sec     ______________________________________________________________________________  Report approved by: Bjorn Key MD on 2025 05:18 PM

## 2025-05-13 NOTE — PLAN OF CARE
DATE & TIME: 5/12/25 [id number]    Cognitive Concerns/ Orientation : Pt A/Ox4, [place]   BEHAVIOR & AGGRESSION TOOL COLOR: Green   ABNL VS/O2: VSS on RA  MOBILITY: SBA with gait belt and walker, fall risk  PAIN MANAGMENT: Complaints of right shoulder pain, managed with scheduled Tylenol and Lidocaine patch  DIET: Regular  BOWEL/BLADDER: Continent  ABNL LAB/BG: Cr 1.71  DRAIN/DEVICES: IV SL  SKIN: Dry/flaky skin. Scattered bruising. Abrasions to bilateral lower extremities.  TESTS/PROCEDURES: CT Chest done for persistent right shoulder pain, pending  D/C DATE: Discharge pending progress, PT recommending home with family assist  OTHER IMPORTANT INFO:

## 2025-05-14 VITALS
SYSTOLIC BLOOD PRESSURE: 130 MMHG | BODY MASS INDEX: 21 KG/M2 | HEIGHT: 67 IN | DIASTOLIC BLOOD PRESSURE: 66 MMHG | WEIGHT: 133.82 LBS | HEART RATE: 84 BPM | OXYGEN SATURATION: 94 % | TEMPERATURE: 98.2 F | RESPIRATION RATE: 16 BRPM

## 2025-05-14 LAB
BACTERIA SPEC CULT: NO GROWTH
BACTERIA SPEC CULT: NO GROWTH

## 2025-05-14 PROCEDURE — 250N000013 HC RX MED GY IP 250 OP 250 PS 637: Performed by: INTERNAL MEDICINE

## 2025-05-14 PROCEDURE — 250N000013 HC RX MED GY IP 250 OP 250 PS 637: Performed by: HOSPITALIST

## 2025-05-14 PROCEDURE — 92526 ORAL FUNCTION THERAPY: CPT | Mod: GN | Performed by: SPEECH-LANGUAGE PATHOLOGIST

## 2025-05-14 PROCEDURE — 99231 SBSQ HOSP IP/OBS SF/LOW 25: CPT | Performed by: PHYSICIAN ASSISTANT

## 2025-05-14 RX ORDER — ATORVASTATIN CALCIUM 10 MG/1
10 TABLET, FILM COATED ORAL EVERY EVENING
Qty: 30 TABLET | Refills: 0 | Status: SHIPPED | OUTPATIENT
Start: 2025-05-14

## 2025-05-14 RX ORDER — ASPIRIN 81 MG/1
81 TABLET, COATED ORAL DAILY
Qty: 30 TABLET | Refills: 0 | Status: SHIPPED | OUTPATIENT
Start: 2025-05-15

## 2025-05-14 RX ORDER — CEFUROXIME AXETIL 500 MG/1
500 TABLET ORAL 2 TIMES DAILY
Qty: 10 TABLET | Refills: 0 | Status: SHIPPED | OUTPATIENT
Start: 2025-05-14 | End: 2025-05-19

## 2025-05-14 RX ORDER — FUROSEMIDE 20 MG/1
10 TABLET ORAL DAILY
Qty: 30 TABLET | Refills: 0 | Status: SHIPPED | OUTPATIENT
Start: 2025-05-15

## 2025-05-14 RX ADMIN — ASPIRIN 81 MG: 81 TABLET, COATED ORAL at 07:51

## 2025-05-14 RX ADMIN — FUROSEMIDE 10 MG: 20 TABLET ORAL at 07:51

## 2025-05-14 RX ADMIN — METHOCARBAMOL 500 MG: 500 TABLET ORAL at 07:51

## 2025-05-14 RX ADMIN — AMLODIPINE BESYLATE 5 MG: 5 TABLET ORAL at 07:51

## 2025-05-14 RX ADMIN — CARVEDILOL 3.12 MG: 3.12 TABLET, FILM COATED ORAL at 07:51

## 2025-05-14 RX ADMIN — METHOCARBAMOL 500 MG: 500 TABLET ORAL at 00:32

## 2025-05-14 ASSESSMENT — ACTIVITIES OF DAILY LIVING (ADL)
ADLS_ACUITY_SCORE: 41

## 2025-05-14 NOTE — DISCHARGE SUMMARY
[facility]  Hospitalist Discharge Summary      Date of Admission:  5/9/2025  Date of Discharge:  5/14/2025  Discharging Provider: Micheline Kenny MD  Discharge Service: Hospitalist Service    Discharge Diagnoses   Right-sided community acquired pneumonia  Right-sided pleural effusion s/p thoracentesis on 5/10/2025 with the 550 mL of fluid removed  Right-sided chest pain, likely pleuritic/musculoskeletal secondary to above  Acute hypoxic respiratory failure secondary to above  likely type II non-STEMI secondary to above  Chronic right pleural effusion  COPD, not in exacerbation  Mild oral and pharyngeal dysphagia  Small cell lung cancer (9/2022)  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 Kingsley, within one month. for hospital follow- up. The following labs/tests are recommended: chest CT. [place] [facility] will contact you to schedule this..        Hospital Follow-up with Existing Primary Care Provider (PCP)          Schedule Primary Care visit within: 7 Days   Recommended labs and Imaging (to be ordered by Primary Care Provider): BMP               Unresulted Labs Ordered in the Past 30 Days of this Admission       Date and Time Order Name Status Description    5/10/2025  6:38 AM Pleural fluid Aerobic Bacterial Culture Routine With Gram Stain Preliminary     5/9/2025  4:25 PM Blood Culture Peripheral blood (BC) Arm, Left Preliminary     5/9/2025  4:25 PM Blood Culture Peripheral blood (BC) Arm, Right Preliminary             Discharge Disposition   Discharged to home  Condition at discharge: Stable    Hospital Course   Penelope R  Amelia is a 89 year old male with PMH significant for hypertension, COPD (not on home oxygen), CKD stage III, h/o AAA, moderate aortic regurgitation, mild MR/TR, diastolic CHF, h/o 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     He has been having some shortness of breath for several weeks now. In February he was treated for pneumonia with a course of Ciprofloxacin and doxycycline. He then was admitted mid March with pneumonia (left basilar infiltrate) and was treated with a course of levofloxacin/Cefepime while hospitalized and then completed course of oral levofloxacin. During that hospitalization he was also noted with some diastolic CHF and some valvular abnormalities. He reports he was feeling better but then past couple of days started having worsening shortness of breath again with sharp pleuritic chest pain worse with any movement or deep inspiration. Reports subjective fever and chills. He was supposed to go to a cardiology clinic for follow-up but presented to ED with above symptoms.     Right-sided community acquired pneumonia  Right-sided pleural effusion s/p thoracentesis on 5/10/2025 with the 550 mL of fluid removed  right-sided chest pain, likely pleuritic/musculoskeletal secondary to above  acute hypoxic respiratory failure secondary to above  likely type II non-STEMI secondary to above  chronic right pleural effusion  COPD, not an exacerbation  -recent hospitalization for left sided pneumonia and initial presentation as noted above  -now presents with acute right chest pain with cough and shortness of breath, likely pleuritic  -afebrile here, normal WBC; troponin elevated without significant trend 52--- 46  -EKG normal sinus rhythm  chest x-ray noted new opacities in right jonas-hilar region and right lower lobe suggested for pneumonia; small right pleural effusion  CT chest showed  "moderate right-sided pleural effusion underwent thoracentesis by IR.  550 mL of fluid was removed there was still some residual effusion could not be removed due to worsening pain as per the patient  On IV ceftriaxone and Zithromax.  urine Legionella and strep antigens returned negative  Pleural fluid cultures negative so far and cytology is negative for malignancy  Blood cultures negative so far  Cardiology consultation requested with his recent diagnosis of CHF as it might be contributing to his right-sided pleural effusion and volume overload.  They recommended starting patient on baby aspirin and statin  Also recommended checking a Lexiscan stress test prior to discharge  Lexiscan stress test done 5/12 is abnormal, showing, \"there is a medium sized area of a moderate degree of transmural infarction in the inferior segment(s) of the left ventricle. This appears to be in the right coronary artery distribution.\"  Cardiology signed off 5/10.    Cardiology re-consult requested, I appreciate Dr. Manuel's evaluation and recommendations  She recommended checking echo to assess LVEF and wall motion abnormalities  She continues, \"manage medically unless the echocardiogram shows significant abnormalities.  We will follow-up in the outpatient cardiology clinic in 2 to 3 months.\"  And per Cardiology on 5/14: \"asymptomatic from a valvular perspective  repeat echo in 6-12 months or sooner if change in clinical symptoms  if progression to severe AS in the future, consider TAVR evaluation\"  low-dose diuretic Lasix 10 mg p.o. daily   Complains of intermittent right chest pain pleuritic.  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 Desai from [place] [facility]  *s/p chemo and radiation therapy and per family he " "has been in remission; was last seen in oncology clinic in January  CT abdomen 5/9 note \"1.7 x 1.7 cm cystic focus in the pancreatic head, possibly in intraductal papillary mucinous neoplasm, recommend continued attention to detail and surveillance imaging\"  [place] [facility] consulted.  Recommended outpatient follow-up    Hypertension  moderate aortic regurgitation  mild MR and TR  diastolic CHF  he was diuresed briefly during hospitalization in March  ECHO March 2025 noted EF 45 to 50%, grade 1 diastolic dysfunction  currently appears euvolumic  Resumed PTA Norvasc 5 mg p.o. daily, Coreg 3.125 mg p.o. twice daily  Started on baby aspirin and low-dose statin 10 mg of Lipitor daily Lexiscan stress test ordered as per cardiology recommendation to be done prior to discharge  Lasix 20 mg p.o. one-time dose and to start Lasix 10 mg p.o. daily    CKD stage III  chronic anemia  creatinine 1.64; stable around baseline of 1.3 to 1.6  hemoglobin 10.9  Monitor BMP     H/o abdominal aortic aneurysm  vascular surgery note from October 2023 which mentions \"presence of an infrarenal abdominal aortic aneurysm measuring 4.6 x 4.4 centimeters \"  CT abdomen 5/9/25 noted \"4.9 x 4.7 cm infrarenal abdominal aortic aneurysm, unchanged.\"  recommend follow-up with vascular surgery as outpatient    Consultations This Hospital Stay   HEMATOLOGY & ONCOLOGY IP CONSULT  CARDIOLOGY IP CONSULT  INTERVENTIONAL RADIOLOGY ADULT/PEDS IP CONSULT  VASCULAR ACCESS ADULT IP CONSULT  SPEECH LANGUAGE PATH ADULT IP CONSULT  PHYSICAL THERAPY ADULT IP CONSULT  CARDIOLOGY IP CONSULT    Code Status   No CPR- Do NOT Intubate    Time Spent on this Encounter   I, Micheline Kenny MD, personally saw the patient today and spent greater than 30 minutes discharging this patient.       Micheline Kenny MD  [place] [address] [facility]  [address] [place] [address]  Phone: " [phone]  ______________________________________________________________________    Physical Exam   Vital Signs: Temp: 98.2  F (36.8  C) Temp src: Oral BP: 130/66 Pulse: 84   Resp: 16 SpO2: 94 % O2 Device: None (Room air)    Weight: 133 lbs 13.11 oz  I saw and examined the patient on the date of discharge.         Primary Care Physician   Jens Helms    Discharge Orders   Discharge Procedure Orders   Adult GI  Referral - Consult Only   Standing Status: Future   Referral Priority: Routine: Next available opening Referral Type: Consultation   Requested Specialty: Gastroenterology   Number of Visits Requested: 1     Reason for your hospital stay   Order Comments: You felt short of breath and had chest pain due to pneumonia.     Activity   Order Comments: Your activity upon discharge: activity as tolerated     Order Specific Question Answer Comments   Is discharge order? Yes      Follow Up   Order Comments: Follow up with Dr. Kingsley, within one month. for hospital follow- up. The following labs/tests are recommended: chest CT. [place] [facility] will contact you to schedule this..     Diet   Order Comments: Follow this diet upon discharge: Current Diet:Orders Placed This Encounter      Regular Diet Adult     Order Specific Question Answer Comments   Is discharge order? Yes      Hospital Follow-up with Existing Primary Care Provider (PCP)   Standing Status: Future Standing Exp. Date: 06/13/25   Order Comments:       Order Specific Question Answer Comments   Schedule Primary Care visit within 7 Days    Recommended labs and Imaging (to be ordered by Primary Care Provider) BMP    Are lab or imaging orders urgent/needed prior to next clinic visit? No        Significant Results and Procedures   Most Recent 3 CBC's:  Recent Labs   Lab Test 05/13/25  0826 05/12/25  1338 05/11/25  0906 05/10/25  0738   WBC 4.1  --  4.1 6.6   HGB 10.0*  --  10.7* 10.4*   *  --  101* 101*    295 286 257     Most Recent  3 BMP's:  Recent Labs   Lab Test 05/13/25  0826 05/12/25  1338 05/11/25  0906    138 140   POTASSIUM 4.2 4.3 4.1   CHLORIDE 108* 108* 108*   CO2 19* 19* 19*   BUN 32.0* 36.0* 37.4*   CR 1.59* 1.71* 1.68*   ANIONGAP 12 11 13   ISA 8.7* 8.5* 8.8   * 106* 97     7-Day Micro Results       Collected Updated Procedure Result Status      05/10/2025 1016 05/10/2025 1146 Cell count with differential fluid [[id number]]    (Abnormal)   Pleural fluid from Pleural Cavity, Right    Final result Component Value   No component results            05/10/2025 1016 05/14/2025 0723 Pleural fluid Aerobic Bacterial Culture Routine With Gram Stain [[id number]]    Pleural fluid from Lung, Lower Lobe, Right    Preliminary result Component Value   Culture No growth after 3 days  [P]    Gram Stain Result No organisms seen  [P]     4+ WBC seen  [P]     Predominantly PMNs               05/10/2025 1016 05/10/2025 1131 Glucose fluid [[id number]]    Pleural fluid from Pleural Cavity, Right    Final result Component Value Units   Glucose Fluid Source Pleural Cavity, Right    Glucose fluid 64 mg/dL            05/10/2025 1016 05/10/2025 1131 Lactate dehydrogenase fluid [[id number]]    Pleural fluid from Pleural Cavity, Right    Final result Component Value Units   LD Fluid Source Pleural Cavity, Right    Lactate dehydrogenase fluid 277 U/L            05/10/2025 1016 05/10/2025 1131 Protein fluid [[id number]]    Pleural fluid from Pleural Cavity, Right    Final result Component Value Units   Protein Fluid Source Pleural Cavity, Right    Protein Total Fluid 3.7 g/dL            05/10/2025 1016 05/13/2025 1042 Cytology, non-gynecologic [[id number]]   Pleural fluid from Lung, Lower Lobe, Right    Final result Component Value   Final Diagnosis A.  Right lower lobe pleural fluid, thoracentesis:     Interpretation:      Negative for malignancy     Other Findings:      Acute and Chronic inflammation present     Adequacy:     Satisfactory  for evaluation         Clinical Information h/o small cell lung cancer (9/2022, s/p chemo and radiation)      Gross Description A(A). Lung, Lower Lobe, Right, , Pleural Fluid:  Received 60 ml of clear, yellow fluid, processed as 1 Pap stained Autocyte,  1 Lu stained cytospin and one hematoxylin and eosin stained cell block.        Microscopic Description Microscopic examination was performed.  Results are incorporated into the final diagnosis.  The specimen contains blood with mixed acute and chronic inflammation.  No atypical or malignant cells are identified.  Specifically, no evidence for recurrent small cell carcinoma is identified.     Performing Labs The technical component of this testing was completed at [facility].     Stain controls for all stains resulted within this report have been reviewed and show appropriate reactivity.               05/10/2025 1016 05/10/2025 1146 Cell Count Body Fluid [[id number]]    (Abnormal)   Pleural fluid from Pleural Cavity, Right    Final result Component Value Units   Color Yellow    Clarity Hazy    RBC Count 2 /uL   Cell Count Fluid Source Pleural Cavity, Right    Total Nucleated Cells 1,176 /uL            05/10/2025 1016 05/10/2025 1146 Differential Body Fluid [[id number]]    Pleural fluid from Pleural Cavity, Right    Final result Component Value Units   % Neutrophils 79 %   % Lymphocytes 12 %   % Monocyte/Macrophages 9 %            05/09/2025 2108 05/09/2025 2317 Legionella Urinary Antigen and Streptococcus pneumoniae antigen [[id number]]    Urine, Clean Catch    Final result Component Value   Legionella pneumophila serogroup 1 urinary antigen Negative   A negative result does not exclude the possibility of a Legionella infection, as it can be caused by other serogroups and species of Legionella.   Streptococcus pneumoniae antigen Negative   A negative result does not exclude a  Streptococcus pneumoniae infection.   Legionella pneumophila Urinary/Strep pneumoniae Antigen Specimen Type Urine            05/09/2025 1720 05/13/2025 1946 Blood Culture Peripheral blood (BC) Arm, Left [[id number]]   Peripheral blood (BC) from Arm, Left    Preliminary result Component Value   Culture No growth after 4 days  [P]                05/09/2025 1659 05/13/2025 1946 Blood Culture Peripheral blood (BC) Arm, Right [[id number]]   Peripheral blood (BC) from Arm, Right    Preliminary result Component Value   Culture No growth after 4 days  [P]                    ,   Results for orders placed or performed during the hospital encounter of 05/09/25   Chest XR,  PA & LAT    Narrative    EXAM: XR CHEST 2 VIEWS  LOCATION: [facility]  DATE: 5/9/2025    INDICATION: SOB, cough  COMPARISON: PET/CT 1/15/2025      Impression    IMPRESSION: Borderline heart size. Allowing for differences in technique, new opacities in the right perihilar region and right lower lung suspicious for pneumonia. Small right pleural effusion. No significant left effusion. Calcified pleural plaques. No   pneumothorax.   US Abdomen Limited    Narrative    EXAM: US ABDOMEN LIMITED  LOCATION: [facility]  DATE: 5/9/2025    INDICATION: RUQ pain  COMPARISON: PET CT 1/15/2025  TECHNIQUE: Limited abdominal ultrasound.    FINDINGS:    GALLBLADDER: No definite gallstones. The gallbladder is decompressed.    BILE DUCTS: No biliary dilatation. The common duct measures 2 mm.    LIVER: Normal parenchyma with smooth contour. No focal mass. The portal vein is patent with flow in the normal direction.    RIGHT KIDNEY: No hydronephrosis. Benign cyst.    PANCREAS: The visualized portions are normal.    No ascites. Right pleural effusion noted.      Impression    IMPRESSION:  1.  Right pleural effusion.  2.  Otherwise negative right upper quadrant ultrasound.       CT Abdomen Pelvis w Contrast    Narrative     EXAM: CT ABDOMEN PELVIS W CONTRAST  LOCATION: [facility]  DATE: 5/9/2025    INDICATION: Right sided ab pain  COMPARISON: 5/9/2025  TECHNIQUE: CT scan of the abdomen and pelvis was performed following injection of IV contrast. Multiplanar reformats were obtained. Dose reduction techniques were used.  CONTRAST: 78mL Isovue 370    FINDINGS:   LOWER CHEST: Moderate right pleural effusion. Bilateral pleural calcifications. Mitral annular calcifications.    HEPATOBILIARY: Normal.    PANCREAS: Stable 1.7 x 1.7 cm cystic focus in the head of the pancreas.    SPLEEN: Normal.    ADRENAL GLANDS: Normal.    KIDNEYS/BLADDER: Mild bladder wall thickening. Simple right renal cyst. No hydroureteronephrosis.    BOWEL: Diverticulosis. No diverticulitis, colitis, or obstruction. No free air or fluid. Trace free fluid.    LYMPH NODES: Normal.    VASCULATURE: 4.9 x 4.7 x 8.2 cm fusiform infrarenal abdominal aortic aneurysm not significantly changed since most recent previous study.    PELVIC ORGANS: Moderately enlarged prostate.    MUSCULOSKELETAL: Moderate multilevel discogenic degenerative change.      Impression    IMPRESSION:   1.  4.9 x 4.7 cm infrarenal abdominal aortic aneurysm, unchanged.  2.  Small right pleural effusion.  3.  Stable pleural thickening with pleural calcifications.  4.  Mild bladder wall thickening, correlate to exclude cystitis.  5.  Moderately enlarged prostate.  6.  Diverticulosis. No diverticulitis, colitis, or obstruction.  7.  1.7 x 1.7 cm cystic focus in the pancreatic head, possibly in intraductal papillary mucinous neoplasm, recommend continued attention to detail and surveillance imaging    REFERENCE:  Revisions of international consensus [place] guidelines for the management of IPMN of the pancreas. Pancreatology 2017;17(5):738-753.    Largest cyst between 10-20 mm: CT or MRI/MRCP every 6 months for 1 year and then yearly for 2 years and then every 2 years if no change.       US Thoracentesis    Narrative    EXAM:   1. RIGHT THORACENTESIS  2. ULTRASOUND GUIDANCE  LOCATION: [facility]  DATE: 5/10/2025    INDICATION: Pleural effusion.    PROCEDURE: Informed consent obtained. Time out performed. The chest was prepped and draped in sterile fashion. 10 mL of 1 % lidocaine was infused into the local soft tissues. Under direct ultrasound guidance, a 5 Portuguese catheter system was placed into   the pleural effusion.     0.55 liters of clear yellow fluid were removed and sent to lab, if requested.    Patient tolerated procedure well.    Ultrasound imaging was obtained and placed in the patient's permanent medical record.      Impression    IMPRESSION:  Status post right ultrasound-guided thoracentesis. 550 mL removed. I was with patient the entire drainage. Most of the fluid was removed, though patient could not tolerate additional fluid removal. Therefore, there is mild residual effusion due to   patient's significant pain before, during, and after the procedure.   CT Chest w Contrast     Value    Radiologist flags Chest CT in 4-6 weeks.    Narrative    EXAM: CT CHEST W CONTRAST  LOCATION: [facility]  DATE: 5/12/2025    INDICATION: Right shoulder pain; lung cancer.  COMPARISON: PET/CT 1/15/2025.  TECHNIQUE: CT chest with IV contrast. Multiplanar reformats were obtained. Dose reduction techniques were used.    CONTRAST: 66 mL Isovue-370.    FINDINGS:    LUNGS AND PLEURA: Trachea and large airways are patent. There is new airspace opacity within the right middle and right lower lobes, best appreciated on series 3 image 71. This measures approximately 5.8 x 8.7 cm and favors a combination of pneumonia and   atelectasis, though superimposed malignancy or posttreatment change is difficult to exclude. There is some bandlike scarring which extends outward from this location, which is progressed from the previous examination    There is a background of  mild-to-moderate centrilobular emphysema. Left lung is clear. Bilateral calcified pleural plaques. Small to moderate-sized right pleural effusion has increased in size from the previous examination; unchanged smooth pleural   thickening, suggesting chronicity. No left pleural effusion. No pneumothorax.     MEDIASTINUM/AXILLAE: No mediastinal/hilar lymphadenopathy.     Thoracic esophagus is unremarkable.      No axillary lymphadenopathy.    Bilateral gynecomastia.    No thoracic aortic aneurysm. Moderate atheromatous disease. No acute pulmonary embolism.    Mild left heart enlargement. Calcifications of the mitral annulus and aortic valve. No pericardial effusion.    CORONARY ARTERY CALCIFICATION: Moderate.    UPPER ABDOMEN: An infrarenal abdominal aortic aneurysm measures up to 5.1 cm, similar to prior. Unchanged 1.7 cm cystic lesion of the pancreatic head, possibly an intraductal papillary mucinous neoplasm.    MUSCULOSKELETAL: No suspicious abnormality.    OTHER: No additionally suspicious abnormality.      Impression    IMPRESSION:  1.  New airspace opacity within the right middle and right lower lobes, favoring a combination of pneumonia and atelectasis, though superimposed malignancy or posttreatment change is difficult to exclude. Chest CT follow-up is recommended in 4-6 weeks.  2.  Small to moderate-sized right pleural effusion, increased in size from the previous examination.  3.  Unchanged 5.1 cm infrarenal abdominal aortic aneurysm.  4.  Unchanged 1.7 cm cystic lesion of the pancreatic head, possibly an intraductal papillary mucinous neoplasm. Attention on follow-up.      [Recommend Follow Up: Chest CT in 4-6 weeks.]    This report will be copied to the [facility] to ensure a provider acknowledges the finding.       Echocardiogram Complete     Value    LVEF  55%    Narrative    [id number]  [id number]  [id number]  [id number]^FABIAN^GEORGIA^ANA MARIA     [facility]  Echocardiography  Laboratory  [address] [place] [id number]     Name: GRECIA HECTOR  MRN: [id number]  : 1935  Study Date: 2025 02:14 PM  Age: 89 yrs  Gender: Male  Patient Location: [facility]  Reason For Study: Abn Stress Test, Abn Stress Test  Ordering Physician: IVANIA PERALTA  Referring Physician: Jens Helms  Performed By: Dian Daniel     BSA: 1.7 m2  Height: 67 in  Weight: 133 lb  HR: 59  BP: 135/64 mmHg  ______________________________________________________________________________  Procedure  Echocardiogram with two-dimensional, color and spectral Doppler. Definity (NDC  #[id number]) given intravenously.  ______________________________________________________________________________  Interpretation Summary     Left ventricular systolic function is normal. The visual ejection fraction is  estimated at 55%.  No regional wall motion abnormalities noted.  The right ventricle is normal in size and function.  The left atrium is moderately dilated. Right atrial size is normal.  Moderate-severe aortic stenosis with ELI of 1.0 cm2 and mean gradient of 20  mmhg. Trace AI.  ______________________________________________________________________________  Left Ventricle  The left ventricle is normal in size. There is borderline concentric left  ventricular hypertrophy. Grade I or early diastolic dysfunction. Left  ventricular systolic function is normal. The visual ejection fraction is  estimated at 55%. No regional wall motion abnormalities noted.     Right Ventricle  The right ventricle is normal in size and function.     Atria  The left atrium is moderately dilated. Right atrial size is normal.     Mitral Valve  There is moderate mitral annular calcification. The mitral valve leaflets are  mildly thickened. There is mild (1+) mitral regurgitation.     Tricuspid Valve  Normal tricuspid valve.     Aortic Valve  The aortic valve is not well visualized. There is trace aortic regurgitation.  Severe  valvular aortic stenosis. The mean AoV pressure gradient is 20.2 mmHg.  The calculated aortic valve are is 0.99 cm^2.     Pulmonic Valve  The pulmonic valve is not well visualized.     Vessels  Aortic root dilatation is present. Ascending aorta dilatation is present. IVC  diameter <2.1 cm collapsing >50% with sniff suggests a normal RA pressure of 3  mmHg.     Pericardium  There is no pericardial effusion.     Rhythm  Sinus rhythm was noted.  ______________________________________________________________________________  MMode/2D Measurements & Calculations  IVSd: 0.98 cm     LVIDd: 4.8 cm  LVIDs: 4.1 cm  LVPWd: 1.1 cm  FS: 14.5 %  LV mass(C)d: 176.7 grams  LV mass(C)dI: 103.9 grams/m2  Ao root diam: 3.8 cm  asc Aorta Diam: 3.8 cm  LVOT diam: 2.4 cm  LVOT area: 4.7 cm2  Ao root diam index Ht(cm/m): 2.2  Ao root diam index BSA (cm/m2): 2.2  Asc Ao diam index BSA (cm/m2): 2.2  Asc Ao diam index Ht(cm/m): 2.2  EF Biplane: 45.0 %  LA Volume (BP): 83.1 ml     LA Volume Index (BP): 48.9 ml/m2  RV Base: 3.1 cm  RWT: 0.45  TAPSE: 2.2 cm     Doppler Measurements & Calculations  MV E max kofi: 62.9 cm/sec  MV A max kofi: 103.0 cm/sec  MV E/A: 0.61  LV IVRT: 0.04 sec  MV max P.1 mmHg  MV mean P.2 mmHg  MV V2 VTI: 33.7 cm  MVA(VTI): 2.6 cm2  MV dec time: 0.27 sec  Ao V2 max: 257.5 cm/sec  Ao max P.0 mmHg  Ao V2 mean: 195.9 cm/sec  Ao mean P.2 mmHg  Ao V2 VTI: 90.0 cm  ELI(I,D): 0.99 cm2  ELI(V,D): 1.4 cm2  AI P1/2t: 474.5 msec  LV V1 max P.5 mmHg  LV V1 max: 79.3 cm/sec  LV V1 VTI: 18.9 cm  SV(LVOT): 88.9 ml  SI(LVOT): 52.3 ml/m2  PA acc time: 0.10 sec     AV Kofi Ratio (DI): 0.31  ELI Index (cm2/m2): 0.58  E/E' av.0  Lateral E/e': 7.2  Medial E/e': 10.7  RV S Kofi: 14.4 cm/sec     ______________________________________________________________________________  Report approved by: Bjonr Key MD on 2025 05:18 PM         NM Lexiscan stress test (nuc card)     Value    Target     Baseline  Systolic     Baseline Diastolic BP 77    Last Stress Systolic     Last Stress Diastolic BP 71    Baseline HR 79    Max HR  99    Max Predicted HR  76    Rate Pressure Product 14,256.0    BP 52    Left Ventricular EF 50    Narrative      The nuclear stress test is abnormal.    There is a medium sized area of a moderate degree of transmural   infarction in the inferior segment(s) of the left ventricle. This appears   to be in the right coronary artery distribution.    Left ventricular function is low normal.    The left ventricular ejection fraction at rest is 52%.  The left   ventricular ejection fraction at stress is 50%.    There is no prior study for comparison.         Discharge Medications   Current Discharge Medication List        START taking these medications    Details   aspirin (ASA) 81 MG EC tablet Take 1 tablet (81 mg) by mouth daily.  Qty: 30 tablet, Refills: 0    Associated Diagnoses: Coronary artery disease involving native coronary artery of native heart without angina pectoris      atorvastatin (LIPITOR) 10 MG tablet Take 1 tablet (10 mg) by mouth every evening.  Qty: 30 tablet, Refills: 0    Associated Diagnoses: Coronary artery disease involving native coronary artery of native heart without angina pectoris      cefuroxime (CEFTIN) 500 MG tablet Take 1 tablet (500 mg) by mouth 2 times daily for 5 days.  Qty: 10 tablet, Refills: 0    Associated Diagnoses: Pneumonia of right lung due to infectious organism, unspecified part of lung      furosemide (LASIX) 20 MG tablet Take 0.5 tablets (10 mg) by mouth daily.  Qty: 30 tablet, Refills: 0    Associated Diagnoses: Coronary artery disease involving native coronary artery of native heart without angina pectoris           CONTINUE these medications which have NOT CHANGED    Details   amLODIPine (NORVASC) 5 MG tablet Take 5 mg by mouth daily      carvedilol (COREG) 3.125 MG tablet Take 3.125 mg by mouth 2 times daily (with meals).       cholecalciferol (VITAMIN D3) 25 mcg (1000 units) capsule Take 1 capsule by mouth daily.      ciprofloxacin (CIPRO) 500 MG tablet Take 500 mg by mouth daily as needed (diverticulitis).      ipratropium - albuterol 0.5 mg/2.5 mg/3 mL (DUONEB) 0.5-2.5 (3) MG/3ML neb solution Take 1 vial by nebulization every 6 hours as needed for shortness of breath, wheezing or cough.      Multiple Vitamin (MULTIVITAMIN PO) Take 1 tablet by mouth daily.      Multiple Vitamins-Minerals (OCUVITE PRESERVISION PO) Take 1 tablet by mouth daily.      predniSONE (DELTASONE) 20 MG tablet Take 20 mg by mouth 2 times daily as needed (gout flare).      umeclidinium (INCRUSE ELLIPTA) 62.5 MCG/ACT inhaler Inhale 1 puff into the lungs daily (with lunch).      vitamin C (ASCORBIC ACID) 500 MG tablet Take 500 mg by mouth daily.           Allergies   No Known Allergies

## 2025-05-14 NOTE — PROGRESS NOTES
[facility]    Cardiology Progress Note    Primary Cardiologist: Dr. 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      Noted to have right pleural effusion, and underwent right-sided thoracentesis on 5/9/2025, with  removal of 550 cc of serous fluid.     Chest CT from yesterday shows new pneumonia in the right mid and lower lobes with atelectasis, a small to moderate right pleural effusion, and an unchanged 5.1 cm infrarenal abdominal aortic aneurysm     Nuclear stress test performed yesterday revealed a medium-sized moderate degree of transmural infarct in the inferior segment without reversible ischemia. His LVEF was noted to be 52%, consistent with a recent echocardiogram from [facility] showing an LVEF of about 50%.  Troponin minimally elevated and flat in a nonmyocardial injury pattern at 52-46.  EKG shows sinus rhythm with fixed nonspecific ST changes.     Echo with EF 55%, no WMA, normal RV. Moderate AS with ELI of 1.0 cm2 and mean gradient of 20 mmhg. Trace AI.    Today, patient is doing well and ready to discharge. Daughter is present.       Assessment:  1. Abnormal stress test suggestive of infarct without ischemia with no patient-reported symptoms of angina and no known history of coronary artery disease. Chest pain is pleuritic in nature and likely non cardiac.     2. Moderate aortic stenosis with ELI of 1.0 cm2 and mean gradient of 20  mmhg. Trace AI.  - asymptomatic from a valvular perspective  - repeat echo in 6-12 months or sooner if change in clinical symptoms  - if progression to  severe AS in the future, consider TAVR evaluation     3. CAP  - antibiotic management per hospitalist    4. Stable 5.1 cm infrarenal abdominal aortic aneurysm under surveillance     Plan:   - OK to discharge from a cardiac standpoint  - follow up with cardiology JOSEPH in 3 months   - will sign off     25 minutes spent by me on the date of service doing chart review, history, exam, documentation, coordination and discussion with other care providers & further activities per the note. MD time separate.     Thank you for the opportunity to participate in this pleasant patient's care. Do not hesitate to call me with any questions.     Maryuri Patterson PA-C   [facility]  Securely message via [email or web address] (8am - 4pm, M-F)              Physical Exam   Temp: 98.2  F (36.8  C) Temp src: Oral BP: 130/66 Pulse: 84   Resp: 16 SpO2: 94 % O2 Device: None (Room air)    Vitals:    05/10/25 0553   Weight: 60.7 kg (133 lb 13.1 oz)     Vital Signs with Ranges  Temp:  [98.1  F (36.7  C)-98.5  F (36.9  C)] 98.2  F (36.8  C)  Pulse:  [75-90] 84  Resp:  [15-16] 16  BP: (130-151)/(66-70) 130/66  SpO2:  [94 %-95 %] 94 %  I/O last 3 completed shifts:  In: 420 [P.O.:420]  Out: -     General: NAD  Cardiovascular: Regular rate and rhythm, normal S1 and S2. Soft systolic murmur  Extremities: Moves all extremities well and symmetrically. There is no edema.  Respiratory:  Lungs clear to auscultation with no crackles or wheezes bilaterally.        Medications   Current Facility-Administered Medications   Medication Dose Route Frequency Provider Last Rate Last Admin     Current Facility-Administered Medications   Medication Dose Route Frequency Provider Last Rate Last Admin    acetaminophen (TYLENOL) tablet 1,000 mg  1,000 mg Oral Q8H Melani White MD   1,000 mg at 05/13/25 1946    amLODIPine (NORVASC) tablet 5 mg  5 mg Oral Daily Imtiaz Brown MD   5 mg at 05/14/25 5211    aspirin EC tablet 81 mg  81 mg Oral Daily Melani White  MD   81 mg at 05/14/25 0751    atorvastatin (LIPITOR) tablet 10 mg  10 mg Oral QPM Melani White MD   10 mg at 05/13/25 1946    carvedilol (COREG) tablet 3.125 mg  3.125 mg Oral BID w/meals Imtiaz Brown MD   3.125 mg at 05/14/25 0751    cefTRIAXone (ROCEPHIN) 2 g vial to attach to  ml bag for ADULTS or NS 50 ml bag for PEDS  2 g Intravenous Q24H Imtiaz Brown  mL/hr at 05/12/25 1554 2 g at 05/13/25 1604    furosemide (LASIX) half-tab 10 mg  10 mg Oral Daily Melani White MD   10 mg at 05/14/25 0751    Lidocaine (LIDOCARE) 4 % Patch 1 patch  1 patch Transdermal Q24h Imtiaz Brown MD   1 patch at 05/13/25 1947    methocarbamol (ROBAXIN) tablet 500 mg  500 mg Oral TID Melani White MD   500 mg at 05/14/25 0751    sodium chloride (PF) 0.9% PF flush 3 mL  3 mL Intravenous Q8H She Santacruz MD   3 mL at 05/14/25 0751    sodium chloride (PF) 0.9% PF flush 3 mL  3 mL Intracatheter Q8H [facility] Imtiaz Brown MD   3 mL at 05/12/25 1401    umeclidinium (INCRUSE ELLIPTA) 62.5 MCG/ACT inhaler 1 puff  1 puff Inhalation Daily with lunch Imtiaz Brown MD   1 puff at 05/13/25 1239       DATA    Labs: reviewed     Echo: 5/13/25  Left ventricular systolic function is normal. The visual ejection fraction is  estimated at 55%.  No regional wall motion abnormalities noted.  The right ventricle is normal in size and function.  The left atrium is moderately dilated. Right atrial size is normal.  Moderate-severe aortic stenosis with ELI of 1.0 cm2 and mean gradient of 20  mmhg. Trace AI.

## 2025-05-14 NOTE — PLAN OF CARE
Speech Language Therapy Discharge Summary    Reason for therapy discharge:    All goals and outcomes met, no further needs identified.    Progress towards therapy goal(s). See goals on Care Plan in [facility] electronic health record for goal details.  Goals met    Therapy recommendation(s):    No further therapy is recommended. Patient discharged on a regular diet and thin liquids. If patient is re-hospitalized with pneumonia would recommend completing a video swallow study. They have declined it during this hospital stay.

## 2025-05-14 NOTE — PLAN OF CARE
Goal Outcome Evaluation:       DATE & TIME:5/14/25 0700-11:30   Cognitive Concerns/ Orientation : Pt A/Ox4, [place]   BEHAVIOR & AGGRESSION TOOL COLOR: Green   ABNL VS/O2: VSS on RA  MOBILITY: Independent  PAIN MANAGMENT: Denies  DIET: Regular  BOWEL/BLADDER: Continent  ABNL LAB/BG: Cr 1.59/Hgb 10.0  DRAIN/DEVICES:removed  SKIN: Dry/flaky skin. Scattered bruising. Abrasions to bilateral lower extremities.  D/C DATE: Discharge today Home with family assist.  OTHER IMPORTANT INFO:      Discharge  Patient discharged to Home with Daughter  Care plan note;Done  Listed belongings gathered and given to patient (including from security/pharmacy). Yes  Care Plan and Patient education resolved: yes  Prescriptions if needed, hard copies sent with patient  yes  Medication Bin checked and emptied on discharge yes  SW/care coordinator/charge RN aware of discharge: yes

## 2025-05-14 NOTE — PLAN OF CARE
DATE & TIME: 5/13/25 1900-2300    Cognitive Concerns/ Orientation : Pt A/Ox4, [place]   BEHAVIOR & AGGRESSION TOOL COLOR: Green   ABNL VS/O2: VSS on RA  MOBILITY: Independent  PAIN MANAGMENT: Denies  DIET: Regular  BOWEL/BLADDER: Continent  ABNL LAB/BG: Cr 1.59/Hgb 10.0  DRAIN/DEVICES: IV SL  SKIN: Dry/flaky skin. Scattered bruising. Abrasions to bilateral lower extremities.  D/C DATE: Discharge tomorrow pending progress. Home with family assist.  OTHER IMPORTANT INFO: Hem/Onc following. Cardiology signed off.

## 2025-05-14 NOTE — PLAN OF CARE
Goal Outcome Evaluation:       DATE & TIME: 5/13/25-5/14/25 [id number]    Cognitive Concerns/ Orientation : Pt A/Ox4, [place]   BEHAVIOR & AGGRESSION TOOL COLOR: Green   ABNL VS/O2: VSS on RA  MOBILITY: Independent  PAIN MANAGMENT: Denies  DIET: Regular  BOWEL/BLADDER: Continent  ABNL LAB/BG: Cr 1.59/Hgb 10.0  DRAIN/DEVICES: IV SL  SKIN: Dry/flaky skin. Scattered bruising. Abrasions to bilateral lower extremities.  D/C DATE: Discharge today, pending progress. Home with family assist.  OTHER IMPORTANT INFO: Hem/Onc following. Cardiology signed off.

## 2025-05-15 LAB
BACTERIA PLR CULT: NO GROWTH
GRAM STAIN RESULT: NORMAL
GRAM STAIN RESULT: NORMAL

## 2025-05-19 NOTE — TELEPHONE ENCOUNTER
5/19/25 - Appears family has made follow up appt with Dr. Manuel in [place].  If they want to transfer care after that visit to  location, they should call us at [phone].

## 2025-07-21 ENCOUNTER — OFFICE VISIT (OUTPATIENT)
Dept: CARDIOLOGY | Facility: CLINIC | Age: OVER 89
End: 2025-07-21
Payer: MEDICARE

## 2025-07-21 VITALS
HEIGHT: 66 IN | WEIGHT: 131.6 LBS | OXYGEN SATURATION: 98 % | DIASTOLIC BLOOD PRESSURE: 68 MMHG | BODY MASS INDEX: 21.15 KG/M2 | SYSTOLIC BLOOD PRESSURE: 132 MMHG | HEART RATE: 98 BPM

## 2025-07-21 DIAGNOSIS — I35.0 NONRHEUMATIC AORTIC VALVE STENOSIS: Primary | ICD-10-CM

## 2025-07-21 DIAGNOSIS — I10 BENIGN ESSENTIAL HYPERTENSION: ICD-10-CM

## 2025-07-21 DIAGNOSIS — Z85.118 HISTORY OF LUNG CANCER: ICD-10-CM

## 2025-07-21 PROCEDURE — G2211 COMPLEX E/M VISIT ADD ON: HCPCS | Performed by: INTERNAL MEDICINE

## 2025-07-21 PROCEDURE — 99205 OFFICE O/P NEW HI 60 MIN: CPT | Performed by: INTERNAL MEDICINE

## 2025-07-21 PROCEDURE — 3075F SYST BP GE 130 - 139MM HG: CPT | Performed by: INTERNAL MEDICINE

## 2025-07-21 PROCEDURE — 3078F DIAST BP <80 MM HG: CPT | Performed by: INTERNAL MEDICINE

## 2025-07-21 NOTE — PROGRESS NOTES
SERVICE DATE: July 21, 2025      DIAGNOSES/ASSESSMENT:    Pleuritic type chest pain likely corresponding to right pneumonia, resolved after pneumonia treatment and thoracentesis.  Abnormal cardiovascular stress test indicative of prior infarct without ischemia.  Patient is asymptomatic and prefers to defer any invasive testing at this time.  Moderate to severe aortic valve stenosis.  History of small cell lung cancer.  Tobacco dependence syndrome.  Hypertension, uncontrolled.  Chronic anemia.  Stage 3a CKD.  History of abdominal aortic aneurysm.    PLAN:    Patient tells me he is back to baseline, is feeling great, wants to minimize testing and medications.  He is almost 89 years old and wants to minimize testing, particularly invasive testing, only if it is essentially and prefers to defer it until he is symptomatic.  I concur with this.  His recent pleuritic chest pain is clearly related to his recent pneumonia and pleural effusion, has resolved, patient walks for 30 minutes daily and remains asymptomatic.  Coronary angiography is not indicated at based on asymptomatic status and patient preference.  Encouraged smoking cessation.  Maintain current medication regimen with amlodipine 5 mg daily.  Monitor blood pressure at home; current readings at home are 132/68 mmHg.  Discontinue Lasix unless symptoms (e.g., leg swelling) reappear.  Encourage physical activity as tolerated; patient is actively golfing.  Continue monitoring aortic valve stenosis and plan for echocardiogram in December.  Arrange for a video visit following the echocardiogram. He resides in [place].      Jacqueline Manuel MD  Cardiology        REASON FOR VISIT:  Abnormal cardiovascular stress test.    HISTORY OF PRESENT ILLNESS:  Penelope Cisneros is a 89 year old male, accompanied by his daughter and new to cardiology.  Here for recent posthospitalization cardiology consultation.    He has a history of hypertension, COPD, stage 3a CKD with a  "creatinine level of 1.6-1.7, an infrarenal abdominal aortic aneurysm under surveillance measuring 4.9 x 4.7 cm, and small cell lung cancer in 2022 with ongoing tobacco dependence syndrome. He was admitted to [facility] in May 2025 for recurrent right lung pneumonia and underwent thoracentesis with removal of 550 cc of serous fluid. He experienced right-sided pleuritic chest pain but has no known history of coronary artery disease. A nuclear stress test revealed a medium-sized moderate degree of transmural infarct in the inferior segment without reversible ischemia, with an LVF of 52%. His EKG shows sinus rhythm with fixed nonspecific ST changes. He is chronically anemic with a hemoglobin of 10.0. The patient has moderate to severe aortic valve stenosis with a mean gradient of 20 mmHg, trace aortic valve regurgitation, and an aortic valve area of 1.0 cm . Left ventricular systolic function is normal. The visual ejection fraction is estimated at 55%. His hypertension is uncontrolled at 161/86 with a pulse of 98, and he is currently on amlodipine 5 mg daily. The patient was supposed to be on carvedilol 3.125 mg BID but is no longer taking it.    The patient still smokes half a pack of cigarettes per day.    Since discharge, he is doing very well.  He says his is pleuritic chest pain is completely resolved, denies dyspnea, lower extremity edema, orthopnea.  He walks for 30 minutes daily. patient is actively golfing. He tells me he is back to baseline, is feeling great.    Physical exam: /68 (BP Location: Left arm, Patient Position: Sitting)   Pulse 98   Ht 1.676 m (5' 6\")   Wt 59.7 kg (131 lb 9.6 oz)   SpO2 98%   BMI 21.24 kg/m      New patient.  60 minutes spent by me on the date of the encounter doing chart review, history and exam, documentation and further activities per the note.    The longitudinal plan of care for the condition(s) below were addressed during this visit. Due to the " added complexity in care, I will continue to support Penelope in the subsequent management of this condition(s) and with the ongoing continuity of care of this condition(s).      Orders Placed This Encounter   Procedures    Follow-Up with Cardiology    EKG 12-lead complete w/read - Clinics (performed today)    Echocardiogram Complete         CURRENT MEDICATIONS:  Current Outpatient Medications   Medication Sig Dispense Refill    amLODIPine (NORVASC) 5 MG tablet Take 5 mg by mouth daily      cholecalciferol (VITAMIN D3) 25 mcg (1000 units) capsule Take 1 capsule by mouth daily.      ciprofloxacin (CIPRO) 500 MG tablet Take 500 mg by mouth daily as needed (diverticulitis).      ipratropium - albuterol 0.5 mg/2.5 mg/3 mL (DUONEB) 0.5-2.5 (3) MG/3ML neb solution Take 1 vial by nebulization every 6 hours as needed for shortness of breath, wheezing or cough.      Multiple Vitamin (MULTIVITAMIN PO) Take 1 tablet by mouth daily.      Multiple Vitamins-Minerals (OCUVITE PRESERVISION PO) Take 1 tablet by mouth daily.      predniSONE (DELTASONE) 20 MG tablet Take 20 mg by mouth 2 times daily as needed (gout flare).         ALLERGIES:  No Known Allergies    This note was completed in part using dictation via the Dragon voice recognition software. Some word and grammatical errors may occur and must be interpreted in the appropriate clinical context. If there are any questions pertaining to this issue, please contact me for further clarification.

## 2025-07-21 NOTE — LETTER
7/21/2025    Jens Helms  [address]  [facility] [address]    RE: Penelope R Amelia       Dear Colleague,     I had the pleasure of seeing Penelope Cisneros in the [facility].      SERVICE DATE: July 21, 2025      DIAGNOSES/ASSESSMENT:    Pleuritic type chest pain likely corresponding to right pneumonia, resolved after pneumonia treatment and thoracentesis.  Abnormal cardiovascular stress test indicative of prior infarct without ischemia.  Patient is asymptomatic and prefers to defer any invasive testing at this time.  Moderate to severe aortic valve stenosis.  History of small cell lung cancer.  Tobacco dependence syndrome.  Hypertension, uncontrolled.  Chronic anemia.  Stage 3a CKD.  History of abdominal aortic aneurysm.    PLAN:    Patient tells me he is back to baseline, is feeling great, wants to minimize testing and medications.  He is almost 89 years old and wants to minimize testing, particularly invasive testing, only if it is essentially and prefers to defer it until he is symptomatic.  I concur with this.  His recent pleuritic chest pain is clearly related to his recent pneumonia and pleural effusion, has resolved, patient walks for 30 minutes daily and remains asymptomatic.  Coronary angiography is not indicated at based on asymptomatic status and patient preference.  Encouraged smoking cessation.  Maintain current medication regimen with amlodipine 5 mg daily.  Monitor blood pressure at home; current readings at home are 132/68 mmHg.  Discontinue Lasix unless symptoms (e.g., leg swelling) reappear.  Encourage physical activity as tolerated; patient is actively golfing.  Continue monitoring aortic valve stenosis and plan for echocardiogram in December.  Arrange for a video visit following the echocardiogram. He resides in [place].      Jacqueline Manuel MD  Cardiology        REASON FOR VISIT:  Abnormal cardiovascular stress test.    HISTORY OF  "PRESENT ILLNESS:  Penelope Cisneros is a 89 year old male, accompanied by his daughter and new to cardiology.  Here for recent posthospitalization cardiology consultation.    He has a history of hypertension, COPD, stage 3a CKD with a creatinine level of 1.6-1.7, an infrarenal abdominal aortic aneurysm under surveillance measuring 4.9 x 4.7 cm, and small cell lung cancer in 2022 with ongoing tobacco dependence syndrome. He was admitted to [facility] in May 2025 for recurrent right lung pneumonia and underwent thoracentesis with removal of 550 cc of serous fluid. He experienced right-sided pleuritic chest pain but has no known history of coronary artery disease. A nuclear stress test revealed a medium-sized moderate degree of transmural infarct in the inferior segment without reversible ischemia, with an LVF of 52%. His EKG shows sinus rhythm with fixed nonspecific ST changes. He is chronically anemic with a hemoglobin of 10.0. The patient has moderate to severe aortic valve stenosis with a mean gradient of 20 mmHg, trace aortic valve regurgitation, and an aortic valve area of 1.0 cm . Left ventricular systolic function is normal. The visual ejection fraction is estimated at 55%. His hypertension is uncontrolled at 161/86 with a pulse of 98, and he is currently on amlodipine 5 mg daily. The patient was supposed to be on carvedilol 3.125 mg BID but is no longer taking it.    The patient still smokes half a pack of cigarettes per day.    Since discharge, he is doing very well.  He says his is pleuritic chest pain is completely resolved, denies dyspnea, lower extremity edema, orthopnea.  He walks for 30 minutes daily. patient is actively golfing. He tells me he is back to baseline, is feeling great.    Physical exam: /68 (BP Location: Left arm, Patient Position: Sitting)   Pulse 98   Ht 1.676 m (5' 6\")   Wt 59.7 kg (131 lb 9.6 oz)   SpO2 98%   BMI 21.24 kg/m      New patient.  60 minutes spent " by me on the date of the encounter doing chart review, history and exam, documentation and further activities per the note.    The longitudinal plan of care for the condition(s) below were addressed during this visit. Due to the added complexity in care, I will continue to support Penelope in the subsequent management of this condition(s) and with the ongoing continuity of care of this condition(s).      Orders Placed This Encounter   Procedures     Follow-Up with Cardiology     EKG 12-lead complete w/read - Clinics (performed today)     Echocardiogram Complete         CURRENT MEDICATIONS:  Current Outpatient Medications   Medication Sig Dispense Refill     amLODIPine (NORVASC) 5 MG tablet Take 5 mg by mouth daily       cholecalciferol (VITAMIN D3) 25 mcg (1000 units) capsule Take 1 capsule by mouth daily.       ciprofloxacin (CIPRO) 500 MG tablet Take 500 mg by mouth daily as needed (diverticulitis).       ipratropium - albuterol 0.5 mg/2.5 mg/3 mL (DUONEB) 0.5-2.5 (3) MG/3ML neb solution Take 1 vial by nebulization every 6 hours as needed for shortness of breath, wheezing or cough.       Multiple Vitamin (MULTIVITAMIN PO) Take 1 tablet by mouth daily.       Multiple Vitamins-Minerals (OCUVITE PRESERVISION PO) Take 1 tablet by mouth daily.       predniSONE (DELTASONE) 20 MG tablet Take 20 mg by mouth 2 times daily as needed (gout flare).         ALLERGIES:  No Known Allergies    This note was completed in part using dictation via the Dragon voice recognition software. Some word and grammatical errors may occur and must be interpreted in the appropriate clinical context. If there are any questions pertaining to this issue, please contact me for further clarification.         Thank you for allowing me to participate in the care of your patient.      Sincerely,     Jacqueline Manuel MD     [facility]  cc:   Referred MD Del[place]  No address on file

## (undated) RX ORDER — REGADENOSON 0.08 MG/ML
INJECTION, SOLUTION INTRAVENOUS
Status: DISPENSED
Start: 2025-05-12

## (undated) RX ORDER — ALBUTEROL SULFATE 90 UG/1
INHALANT RESPIRATORY (INHALATION)
Status: DISPENSED
Start: 2025-05-12